# Patient Record
Sex: FEMALE | Race: WHITE | Employment: FULL TIME | URBAN - NONMETROPOLITAN AREA
[De-identification: names, ages, dates, MRNs, and addresses within clinical notes are randomized per-mention and may not be internally consistent; named-entity substitution may affect disease eponyms.]

---

## 2017-10-19 ENCOUNTER — HOSPITAL ENCOUNTER (OUTPATIENT)
Dept: PHYSICAL THERAPY | Age: 37
Setting detail: THERAPIES SERIES
Discharge: HOME | End: 2017-10-19
Payer: COMMERCIAL

## 2017-10-19 PROCEDURE — 97110 THERAPEUTIC EXERCISES: CPT

## 2017-10-19 PROCEDURE — 97161 PT EVAL LOW COMPLEX 20 MIN: CPT

## 2017-10-19 ASSESSMENT — PAIN DESCRIPTION - PAIN TYPE: TYPE: ACUTE PAIN

## 2017-10-19 ASSESSMENT — PAIN SCALES - GENERAL: PAINLEVEL_OUTOF10: 3

## 2017-10-19 ASSESSMENT — PAIN DESCRIPTION - ORIENTATION: ORIENTATION: LEFT

## 2017-10-19 ASSESSMENT — PAIN DESCRIPTION - LOCATION: LOCATION: KNEE

## 2017-10-19 NOTE — PROGRESS NOTES
Left leg 4+-5/5 throughout with pain during knee flexion and adduction. Special Tests: Left: negative ant/post drawer, negative med/lat col ligament testing, pain with medial Julianne but not lateral.          Overall Sensation Status: WNL              Ambulation 1  Surface: carpet  Device: No Device  Assistance: Independent  Quality of Gait: Good sundeep and appears to have even step length. No limp noted. Distance: 50 feet        Exercises  Exercise 1: Kinesiotape I strip starting just sup to patella and laying down with 25% tension over medial half of patella to just distal to patella  Exercise 2: Educated patient on and had demonstrate HEP: 10x quad sets holding 5 seconds, adduction squeezes holding 5 seconds, SLR, SLR with ER, SAQ with ball between knees and standing HS stretch 3x holding 15 seconds each              Activity Tolerance:  Activity Tolerance: Patient Tolerated treatment well    Assessment: Body structures, Functions, Activity limitations: Decreased functional mobility , Decreased ADL status, Decreased ROM, Decreased strength, Decreased endurance  Assessment: Patient with several areas mentioned above that can be addressed by therapy over 8 weeks  Prognosis: Good  Discharge Recommendations: Continue to assess pending progress    Patient Education:  Patient Education: POC and HEP. If reaction to tape can take off. Can keep tape on for 5-7 days if helping and no reaction.                    Plan:  Times per week: 1-2x/week  Plan weeks: 8 weeks  Specific instructions for Next Treatment: kinesiotape, stretches for knee flexion/HS and IT band, manual therapy/Hawk , strengthening, return to running education  Current Treatment Recommendations: Strengthening, ROM, Functional Mobility Training, Gait Training, Stair training, Neuromuscular Re-education, Modalities, Home Exercise Program, Manual Therapy - Soft Tissue Mobilization  Plan Comment: Initiated POC    History: Personal factors or

## 2017-10-26 ENCOUNTER — HOSPITAL ENCOUNTER (OUTPATIENT)
Dept: PHYSICAL THERAPY | Age: 37
Setting detail: THERAPIES SERIES
Discharge: HOME | End: 2017-10-26
Payer: COMMERCIAL

## 2017-10-26 PROCEDURE — 97110 THERAPEUTIC EXERCISES: CPT

## 2017-10-26 ASSESSMENT — PAIN DESCRIPTION - PAIN TYPE: TYPE: ACUTE PAIN

## 2017-10-26 ASSESSMENT — PAIN DESCRIPTION - ORIENTATION: ORIENTATION: LEFT

## 2017-10-26 ASSESSMENT — PAIN SCALES - GENERAL: PAINLEVEL_OUTOF10: 1

## 2017-10-26 ASSESSMENT — PAIN DESCRIPTION - LOCATION: LOCATION: KNEE

## 2017-10-26 NOTE — PROGRESS NOTES
seconds x 3 each  Exercise 4: L TKE with red 15 x 5 seconds - no pain with performance         Activity Tolerance:  Activity Tolerance: Patient Tolerated treatment well    Assessment:  Assessment: Minimal progressions due to pt showing up late to appointment. Continued to apply tape with no redness or irritation noted when old tape was removed with rubbing alcohol. Added standing TKE with good tolerance. Pt reporting knee feeling a little sore when finished. More soreness reported with SLR with ER. Prognosis: Good       Patient Education:  Patient Education: Continue with HEP. Gave red theraband and handout for TKE. can take tape off if irritation occurs. Plan:  Times per week: 1-2x/week  Plan weeks: 8 weeks  Specific instructions for Next Treatment: kinesiotape, stretches for knee flexion/HS and IT band, manual therapy/Hawk , strengthening, return to running education  Current Treatment Recommendations: Strengthening, ROM, Functional Mobility Training, Gait Training, Stair training, Neuromuscular Re-education, Modalities, Home Exercise Program, Manual Therapy - Soft Tissue Mobilization    Goals:  Patient goals : To be feeling comfortable running again and be able to squat to  kids. Short term goals  Time Frame for Short term goals: 4 weeks  Short term goal 1: Patient to report 25-50% decrease in left knee pain and clicking to be able to start jogging on the treadmill again  Short term goal 2: Patient to demonstrate 5/5 strength in left leg with no pain during testing to be able to squat wihtout pain to  children  Short term goal 3: Patient to demonstrate left knee flexion to 144 degrees to be equal to right for ease with dressing  Short term goal 4: Patient to be able to jog on treadmill with good mechanics and no compensation. Short term goal 5: Patient to be independent with taping of left knee as needed to help keep patella in neutral position.     Long term

## 2017-10-27 ENCOUNTER — HOSPITAL ENCOUNTER (OUTPATIENT)
Dept: PHYSICAL THERAPY | Age: 37
Setting detail: THERAPIES SERIES
Discharge: HOME | End: 2017-10-27
Payer: COMMERCIAL

## 2017-10-27 PROCEDURE — 97110 THERAPEUTIC EXERCISES: CPT

## 2017-10-27 ASSESSMENT — PAIN DESCRIPTION - PAIN TYPE: TYPE: ACUTE PAIN

## 2017-10-27 ASSESSMENT — PAIN DESCRIPTION - ORIENTATION: ORIENTATION: LEFT

## 2017-10-27 ASSESSMENT — PAIN DESCRIPTION - LOCATION: LOCATION: KNEE

## 2017-10-27 ASSESSMENT — PAIN SCALES - GENERAL: PAINLEVEL_OUTOF10: 2

## 2017-10-27 NOTE — PROGRESS NOTES
[place]     Time In: 0900  Time Out: 4244  Minutes: 39  Timed Code Treatment Minutes: 39 Minutes     Date: 10/27/2017  Patient Name: Norma Brown,  Gender:  female        CSN: [id number]   : 1980  (40 y.o.)       Referring Practitioner: Dr Siri Henderson      Diagnosis: Acute pain of left knee  Treatment Diagnosis: left knee pain, left leg weakness   Additional Pertinent Hx: Limit squatting and using stairs. Hx: no signficant history                  General:  PT Visit Information  PT Insurance Information: [facility] - no visit limit. Aquatics and modalities covered  Total # of Visits to Date: 3  Plan of Care/Certification Expiration Date: 17  Progress Note Counter: 3/10 for PN               Subjective:  Chart Reviewed: Yes  Patient assessed for rehabilitation services?: Yes  Family / Caregiver Present: No  Comments: Follow up with doctor beginning of November. States has clicking in left knee and will feel like it will buckle at times. Subjective: Patient reports knee is doing ok today, just some soreness. States not as much clicking when going up and down the stairs but still pain with a full squat. States tape still intact and seems to be helping.      Pain:  Patient Currently in Pain: Yes  Pain Assessment: 0-10  Pain Level: 2 (1-2/10)  Pain Type: Acute pain  Pain Location: Knee  Pain Orientation: Left      Objective    Exercises  Exercise 1: Still intact from yesterday so none needed today - Kinesiotape I strip starting just sup to patella and laying down with 25% tension over medial half of patella to just distal to patella  Exercise 3: Standing HS stretch 15 seconds x 3 each  Exercise 4: L TKE with red 15 x 5 seconds - no pain with performance  Exercise 5: Sport bike seat 2 for 5 minutes level 3  Exercise 6: Rockerboard 10x each of 2 directions with 30 second balance each way  Exercise 7: Standing: 10x each way heel/toe raises, marching 10x bilat, 4-way hip 10x bilat  Exercise 8: Lunges onto BOSU 10x bilat  Exercise 9: Total gym Level E 10x limited range (10-15 deg) squats with ball between knees - no pain. Exercise 10: Hydrostick step stance each way with 10x each of 4 firections         Activity Tolerance:  Activity Tolerance: Patient Tolerated treatment well  Activity Tolerance: Patient reports no change in knee soreness after treatment, just reported leg feeling a little \"jello-y\". Assessment: Body structures, Functions, Activity limitations: Decreased functional mobility , Decreased ADL status, Decreased ROM, Decreased strength, Decreased endurance  Assessment: Patient able to start new loaded strengthening today without increase in pain. Needed cueing for posture with exercises. Prognosis: Good  Discharge Recommendations: Continue to assess pending progress    Patient Education:  Patient Education: Continue with HEP. Start new exercises at home. Plan:  Times per week: 1-2x/week  Plan weeks: 8 weeks  Specific instructions for Next Treatment: kinesiotape, stretches for knee flexion/HS and IT band, manual therapy/Hawk , strengthening, return to running education  Current Treatment Recommendations: Strengthening, ROM, Functional Mobility Training, Gait Training, Stair training, Neuromuscular Re-education, Modalities, Home Exercise Program, Manual Therapy - Soft Tissue Mobilization  Plan Comment: Continue with POC. Goals:  Patient goals : To be feeling comfortable running again and be able to squat to  kids.     Short term goals  Time Frame for Short term goals: 4 weeks  Short term goal 1: Patient to report 25-50% decrease in left knee pain and clicking to be able to start jogging on the treadmill again  Short term goal 2: Patient to demonstrate 5/5 strength in left leg with no pain during testing to be able to squat wihtout pain to  children  Short term goal 3: Patient to demonstrate left knee flexion to 144 degrees to be equal to right for ease with dressing  Short term goal 4: Patient to be able to jog on treadmill with good mechanics and no compensation. Short term goal 5: Patient to be independent with taping of left knee as needed to help keep patella in neutral position. Long term goals  Time Frame for Long term goals : 8 weeks  Long term goal 1: Patient to be independent with home program to be able to return to running.          [address], [address] [place] [address]

## 2017-10-31 ENCOUNTER — HOSPITAL ENCOUNTER (OUTPATIENT)
Dept: PHYSICAL THERAPY | Age: 37
Setting detail: THERAPIES SERIES
Discharge: HOME | End: 2017-10-31
Payer: COMMERCIAL

## 2017-10-31 PROCEDURE — 97110 THERAPEUTIC EXERCISES: CPT

## 2017-10-31 ASSESSMENT — PAIN SCALES - GENERAL: PAINLEVEL_OUTOF10: 3

## 2017-10-31 NOTE — PROGRESS NOTES
(10-15 deg) squats with ball between knees - no pain. Exercise 10: Hydrostick step stance each way with 10x each of 4 firections  Exercise 11: Forward and lateral step ups 4\" x10 bilat         Activity Tolerance:  Activity Tolerance: Patient Tolerated treatment well  Activity Tolerance: Patient reports no change in knee soreness after treatment. Assessment: Body structures, Functions, Activity limitations: Decreased functional mobility , Decreased ADL status, Decreased ROM, Decreased strength, Decreased endurance  Assessment: Applied kinesiotape today with no irritation or reddness when old tape was removed with alcohol/water. Increased reps with total gym, rockerboard, heel/toe raises and marching with good tolerance from patient. Added step ups with no complaints from patient. Pt denied any increased pain at end of treatment session. Prognosis: Good  Discharge Recommendations: Continue to assess pending progress    Patient Education:  Patient Education: Continue with HEP. Start new exercises at home. Plan:  Times per week: 1-2x/week  Plan weeks: 8 weeks  Specific instructions for Next Treatment: kinesiotape, stretches for knee flexion/HS and IT band, manual therapy/Hawk , strengthening, return to running education  Current Treatment Recommendations: Strengthening, ROM, Functional Mobility Training, Gait Training, Stair training, Neuromuscular Re-education, Modalities, Home Exercise Program, Manual Therapy - Soft Tissue Mobilization  Plan Comment: Continue with POC. Goals:  Patient goals : To be feeling comfortable running again and be able to squat to  kids.     Short term goals  Time Frame for Short term goals: 4 weeks  Short term goal 1: Patient to report 25-50% decrease in left knee pain and clicking to be able to start jogging on the treadmill again  Short term goal 2: Patient to demonstrate 5/5 strength in left leg with no pain during testing to be able to squat wihtout pain to

## 2017-11-02 ENCOUNTER — HOSPITAL ENCOUNTER (OUTPATIENT)
Dept: PHYSICAL THERAPY | Age: 37
Setting detail: THERAPIES SERIES
Discharge: HOME | End: 2017-11-02
Payer: COMMERCIAL

## 2017-11-02 PROCEDURE — 97110 THERAPEUTIC EXERCISES: CPT

## 2017-11-02 ASSESSMENT — PAIN DESCRIPTION - PAIN TYPE: TYPE: ACUTE PAIN

## 2017-11-02 ASSESSMENT — PAIN SCALES - GENERAL: PAINLEVEL_OUTOF10: 2

## 2017-11-02 ASSESSMENT — PAIN DESCRIPTION - ORIENTATION: ORIENTATION: LEFT

## 2017-11-02 ASSESSMENT — PAIN DESCRIPTION - LOCATION: LOCATION: KNEE

## 2017-11-02 NOTE — PROGRESS NOTES
[place]     Time In: 6797  Time Out: [address]  Minutes: 32  Timed Code Treatment Minutes: 32 Minutes     Date: 2017  Patient Name: Yue Cross,  Gender:  female        CSN: [id number]   : 1980  (40 y.o.)       Referring Practitioner: Dr Rodney Allison      Diagnosis: Acute pain of left knee  Treatment Diagnosis: left knee pain, left leg weakness   Additional Pertinent Hx: Limit squatting and using stairs. Hx: no signficant history                  General:  PT Visit Information  Onset Date: 10/19/17  PT Insurance Information: [address] - no visit limit. Aquatics and modalities covered  Total # of Visits to Date: 5  Plan of Care/Certification Expiration Date: 17  Progress Note Counter: 5/10 for PN               Subjective:  Family / Caregiver Present: No  Comments: Follow up with doctor beginning of November. States has clicking in left knee and will feel like it will buckle at times. Other (Comment): Script for chondromalacia patella 6-8 visits. Pelvic/core stability, Short arc closed chair quad strengthening, HS stretch, SLR ok     Subjective: Pt reporting knee pain 2/10 today and still thinking that the KT tape is helping.       Pain:  Patient Currently in Pain: Yes  Pain Assessment: 0-10  Pain Level: 2  Pain Type: Acute pain  Pain Location: Knee  Pain Orientation: Left      Objective  Exercises  Exercise 3: Standing HS stretch 15 seconds x 3 each - cues to keep a flat back  Exercise 4: L TKE with red 20 x 5 seconds - B 4 way hip with red x 10 each - no adduction on right leg due to causing pain on L knee  Exercise 5: Sport bike seat 2 for 5 minutes level 3  Exercise 6: Rocker board 15x each of 2 directions with 30 second balance each way  Exercise 7: Standing on foam: 15x each way heel/toe raises, marching 15x bilat  Exercise 8: Lunges onto BOSU 10x bilat  Exercise 9: Total gym Level E 15x limited range (10-15 deg) squats with ball between knees - no pain. Exercise 10: Hydro stick step stance each way with 10x each of 4 directions  Exercise 11: Forward and lateral step ups 4\" x15 bilat  Exercise 12: NuStep seat 9/arms 9 Level 4 x 5 minutes  Exercise 13: Eccentric Lowering off 2\" x 15 - no pain reported and no UE's used         Activity Tolerance:  Activity Tolerance: Patient Tolerated treatment well    Assessment:  Assessment: Pt reporting that kinesiotape was staying on well and did not need to reapply today. Progressed with t-band for 4 way hip but had to hold adduction on right LE due to causing pain on L and added excentric lowing with pt tolerating well. Pt needing minimal cuing during session for correct technique but with no complains of increased knee pain at end of session. Prognosis: Good       Patient Education:  Patient Education: Continue with HEP and gave hand out for 3 way hip with t-band. Plan:  Times per week: 1-2x/week  Plan weeks: 8 weeks  Specific instructions for Next Treatment: kinesiotape, stretches for knee flexion/HS and IT band, manual therapy/Hawk , strengthening, return to running education  Current Treatment Recommendations: Strengthening, ROM, Functional Mobility Training, Gait Training, Stair training, Neuromuscular Re-education, Modalities, Home Exercise Program, Manual Therapy - Soft Tissue Mobilization  Plan Comment: Continue with POC. Goals:  Patient goals : To be feeling comfortable running again and be able to squat to  kids.     Short term goals  Time Frame for Short term goals: 4 weeks  Short term goal 1: Patient to report 25-50% decrease in left knee pain and clicking to be able to start jogging on the treadmill again  Short term goal 2: Patient to demonstrate 5/5 strength in left leg with no pain during testing to be able to squat wihtout pain to  children  Short term goal 3: Patient to demonstrate left knee flexion

## 2017-11-06 ENCOUNTER — HOSPITAL ENCOUNTER (OUTPATIENT)
Dept: PHYSICAL THERAPY | Age: 37
Setting detail: THERAPIES SERIES
Discharge: HOME | End: 2017-11-06
Payer: COMMERCIAL

## 2017-11-06 PROCEDURE — 97110 THERAPEUTIC EXERCISES: CPT

## 2017-11-06 ASSESSMENT — PAIN DESCRIPTION - LOCATION: LOCATION: KNEE

## 2017-11-06 ASSESSMENT — PAIN SCALES - GENERAL: PAINLEVEL_OUTOF10: 1

## 2017-11-06 ASSESSMENT — PAIN DESCRIPTION - ORIENTATION: ORIENTATION: LEFT

## 2017-11-06 ASSESSMENT — PAIN DESCRIPTION - PAIN TYPE: TYPE: ACUTE PAIN

## 2017-11-06 NOTE — PROGRESS NOTES
3  Exercise 6: Rockerboard 15x each of 2 directions with 30 second balance each way  Exercise 7: Standing on foam: 15x each way heel/toe raises, marching 15x bilat  Exercise 8: Lunges onto BOSU 10x bilat  Exercise 9: Total gym Level E 15x limited range (10-15 deg) squats with ball between knees - no pain. Exercise 10: Hydrostick step stance each way with 10x each of 4 directions  Exercise 11: Forward and lateral step ups 4\" x15 bilat - patient started having clicking and pain in left knee with sidestepping so decreased to 2\" and was better  Exercise 13: Eccentric Lowering off 2\" x 10 - no pain reported and no UE's used. Was doing 15x but started to ckick at 10x so stopped. Exercise 14: Discussed HEP and educated on what to continue with. Gave pictures for standing exercises. Activity Tolerance:  Activity Tolerance: Patient Tolerated treatment well  Activity Tolerance: Patient reported painincreased from 1/10 to 2/10 after session. Assessment: Body structures, Functions, Activity limitations: Decreased functional mobility , Decreased ADL status, Decreased ROM, Decreased strength, Decreased endurance  Assessment: Patient has made minimal progress with pain and clicking in knee but has gained increased strength without pain during testing. Patient is able to be independent with current HEP but could be progressed to more use of [place]. Therapy is helping patient overall and will wait to see if doctor feels more therapy would be beneficial for another 3-5 weeks. Prognosis: Good  Discharge Recommendations: Continue to assess pending progress    Patient Education:  Patient Education: Continue with HEP and let know what doctor says after appointment.                       Plan:  Times per week: 1-2x/week  Plan weeks: 5 weeks  Specific instructions for Next Treatment: kinesiotape, stretches for knee flexion/HS and IT band, manual therapy/Hawk , strengthening, return to running education  Current Treatment Recommendations: Strengthening, ROM, Functional Mobility Training, Gait Training, Stair training, Neuromuscular Re-education, Modalities, Home Exercise Program, Manual Therapy - Soft Tissue Mobilization  Plan Comment: Continue with POC. Patient sees doctor and has one more therapy visit scheduled then will adjust POC as needed. Goals:  Patient goals : To be feeling comfortable running again and be able to squat to  kids. Short term goals  Time Frame for Short term goals: 4 weeks  Short term goal 1: Patient to report 25-50% decrease in left knee pain and clicking to be able to start jogging on the treadmill again - NOT MET Patient reports pain and clicking is 24% better. States has not tried jogging on the treadmill yet. Short term goal 2: Patient to demonstrate 5/5 strength in left leg with no pain during testing to be able to squat wihtout pain to  children - NOT MET Patient has met goal for 5/5 throughout most of leg with knee extension 4+/5 and no pain during testing. Short term goal 3: Patient to demonstrate left knee flexion to 144 degrees to be equal to right for ease with dressing - NOT MET Patient has gained 1 degree and is at 141 degrees. Short term goal 4: Patient to be able to jog on treadmill with good mechanics and no compensation. - NOT MET Patient unable to return to at this time. Short term goal 5: Patient to be independent with taping of left knee as needed to help keep patella in neutral position. - MET     Long term goals  Time Frame for Long term goals : 8 weeks  Long term goal 1: Patient to be independent with home program to be able to return to running. - NOT MET Patient is independent with current HEP but needs further progression and has not returned to running yet.     Revised Short-Term Goals:    Short term goals  Time Frame for Short term goals: 5 weeks  Short term goal 1: See LTG  Short term goal 2:    Short term goal 3:    Short term goal 4:    Short term goal 5:       Revised Long-Term Goals  Long term goals  Time Frame for Long term goals : 5 weeks  Long term goal 1: Patient to be independent with home program to be able to return to running. Long term goal 2: Patient to report 25-50% decrease in left knee pain and clicking to be able to start jogging on the treadmill again  Long term goal 3: Patient to demonstrate 5/5 strength in left knee extension and 144 degrees flexion with no pain during testing to be able to squat wihtout pain to  children  Long term goal 4: Patient to be able to jog on treadmill with good mechanics and no compensation.            [address], [address] [place] [address] Drive

## 2017-11-09 ENCOUNTER — HOSPITAL ENCOUNTER (OUTPATIENT)
Dept: PHYSICAL THERAPY | Age: 37
Setting detail: THERAPIES SERIES
End: 2017-11-09
Payer: COMMERCIAL

## 2017-11-15 NOTE — PROGRESS NOTES
[facility]    Patient Name: Sowmya Humphreys        CSN: [id number]   YOB: 1980  Gender: female  Referring Practitioner: Dr Vince Gaffney  Diagnosis: Acute pain of left knee    Patient is discharged from Physical Therapy services at this time. See last note for details related to results of therapy and goal achievement. Reason for discharge:  Patient was seen for re-assess on 11-6-17. Patient saw doctor on 11-8-17 and was supposed to call PT to let know regarding further therapy and if needed to keep re-assess on 11-9-17. Patient called and cancelled appointment on 11-9 but stated did not know from the doctor about therapy and would call us back. Patient has not called back and PT called patient on 11-13-17 and left a message to call back regarding situation. Since patient has not called back she is being discharged.  See last progress note for goal update  Patient discharged as of 11-15-17      [place] [id number]: 11/15/2017

## 2018-11-07 ENCOUNTER — NURSE TRIAGE (OUTPATIENT)
Dept: ADMINISTRATIVE | Age: 38
End: 2018-11-07

## 2018-11-07 NOTE — TELEPHONE ENCOUNTER
\"I had the stomach flu on Sunday and then on Monday I had a bad HA and now I still have a HA  And my neck feels stiff. \"    Reason for Disposition   [1] MODERATE headache (e.g., interferes with normal activities) AND [2] present > 24 hours AND [3] unexplained  (Exceptions: analgesics not tried, typical migraine, or headache part of viral illness)    Answer Assessment - Initial Assessment Questions  1. LOCATION: \"Where does it hurt? \"       Top and in behind the eyes and temples  2. ONSET: \"When did the headache start? \" (Minutes, hours or days)       Monday morning and worse when lying down, fine when she is up but when she goes to bed and lies down the pain starts  3. PATTERN: \"Does the pain come and go, or has it been constant since it started? \"      Constant pain mild but worse when lying down  4. SEVERITY: \"How bad is the pain? \" and \"What does it keep you from doing? \"  (e.g., Scale 1-10; mild, moderate, or severe)    - MILD (1-3): doesn't interfere with normal activities     - MODERATE (4-7): interferes with normal activities or awakens from sleep     - SEVERE (8-10): excruciating pain, unable to do any normal activities         6   And tried tylenol and motrin  5. RECURRENT SYMPTOM: \"Have you ever had headaches before? \" If so, ask: \"When was the last time? \" and \"What happened that time? \"       Yes had ocular HA before, pain worse when bending down also  6. CAUSE: \"What do you think is causing the headache? \"      Not sure, stiff neck when turning neck to the R and feels stiff but chin to chest fine but hurts in the neck, dizzy spells since HA started and has dizziness at least 6 times in a day  7. MIGRAINE: \"Have you been diagnosed with migraine headaches? \" If so, ask: \"Is this headache similar? \"       no  8. HEAD INJURY: \"Has there been any recent injury to the head? \"       no  9. OTHER SYMPTOMS: \"Do you have any other symptoms? \" (fever, stiff neck, eye pain, sore throat, cold symptoms)      Fever on Sunday and

## 2019-01-02 ENCOUNTER — TELEPHONE (OUTPATIENT)
Dept: ENT CLINIC | Age: 39
End: 2019-01-02

## 2019-02-06 ENCOUNTER — OFFICE VISIT (OUTPATIENT)
Dept: ENT CLINIC | Age: 39
End: 2019-02-06
Payer: COMMERCIAL

## 2019-02-06 VITALS
RESPIRATION RATE: 16 BRPM | HEIGHT: 68 IN | SYSTOLIC BLOOD PRESSURE: 126 MMHG | WEIGHT: 241.2 LBS | TEMPERATURE: 98.2 F | DIASTOLIC BLOOD PRESSURE: 86 MMHG | BODY MASS INDEX: 36.56 KG/M2 | HEART RATE: 88 BPM

## 2019-02-06 DIAGNOSIS — R51.9 NONINTRACTABLE EPISODIC HEADACHE, UNSPECIFIED HEADACHE TYPE: ICD-10-CM

## 2019-02-06 DIAGNOSIS — J35.8 TONSIL STONE: Primary | ICD-10-CM

## 2019-02-06 PROCEDURE — G8417 CALC BMI ABV UP PARAM F/U: HCPCS | Performed by: OTOLARYNGOLOGY

## 2019-02-06 PROCEDURE — 99203 OFFICE O/P NEW LOW 30 MIN: CPT | Performed by: OTOLARYNGOLOGY

## 2019-02-06 PROCEDURE — G8484 FLU IMMUNIZE NO ADMIN: HCPCS | Performed by: OTOLARYNGOLOGY

## 2019-02-06 PROCEDURE — G8427 DOCREV CUR MEDS BY ELIG CLIN: HCPCS | Performed by: OTOLARYNGOLOGY

## 2019-02-06 ASSESSMENT — ENCOUNTER SYMPTOMS
CHOKING: 0
COUGH: 0
STRIDOR: 0
WHEEZING: 0
EYE REDNESS: 0
CHEST TIGHTNESS: 0
EYE DISCHARGE: 0
SHORTNESS OF BREATH: 0
FACIAL SWELLING: 0
BACK PAIN: 0
COLOR CHANGE: 0
DIARRHEA: 0
VOICE CHANGE: 0
EYE ITCHING: 1
RHINORRHEA: 0
NAUSEA: 0
ABDOMINAL PAIN: 0
VOMITING: 0
PHOTOPHOBIA: 0
TROUBLE SWALLOWING: 0
SINUS PRESSURE: 1
SORE THROAT: 0
EYE PAIN: 0
APNEA: 0

## 2019-02-15 ENCOUNTER — HOSPITAL ENCOUNTER (OUTPATIENT)
Dept: MRI IMAGING | Age: 39
Discharge: HOME | End: 2019-02-15
Payer: COMMERCIAL

## 2019-02-15 DIAGNOSIS — R51.9 NONINTRACTABLE EPISODIC HEADACHE, UNSPECIFIED HEADACHE TYPE: ICD-10-CM

## 2019-02-15 PROCEDURE — 70551 MRI BRAIN STEM W/O DYE: CPT

## 2019-03-25 ENCOUNTER — TELEPHONE (OUTPATIENT)
Dept: ENT CLINIC | Age: 39
End: 2019-03-25

## 2019-03-25 DIAGNOSIS — R51.9 NONINTRACTABLE EPISODIC HEADACHE, UNSPECIFIED HEADACHE TYPE: Primary | ICD-10-CM

## 2019-04-24 ENCOUNTER — INITIAL CONSULT (OUTPATIENT)
Dept: NEUROLOGY | Age: 39
End: 2019-04-24
Payer: COMMERCIAL

## 2019-04-24 VITALS
WEIGHT: 244 LBS | HEIGHT: 68 IN | DIASTOLIC BLOOD PRESSURE: 72 MMHG | SYSTOLIC BLOOD PRESSURE: 128 MMHG | BODY MASS INDEX: 36.98 KG/M2 | HEART RATE: 80 BPM

## 2019-04-24 DIAGNOSIS — G43.119 INTRACTABLE MIGRAINE WITH AURA WITHOUT STATUS MIGRAINOSUS: Primary | ICD-10-CM

## 2019-04-24 PROCEDURE — 99244 OFF/OP CNSLTJ NEW/EST MOD 40: CPT | Performed by: PSYCHIATRY & NEUROLOGY

## 2019-04-24 PROCEDURE — G8427 DOCREV CUR MEDS BY ELIG CLIN: HCPCS | Performed by: PSYCHIATRY & NEUROLOGY

## 2019-04-24 PROCEDURE — G8417 CALC BMI ABV UP PARAM F/U: HCPCS | Performed by: PSYCHIATRY & NEUROLOGY

## 2019-04-24 RX ORDER — CETIRIZINE HYDROCHLORIDE 10 MG/1
10 TABLET ORAL DAILY
Status: ON HOLD | COMMUNITY
End: 2021-04-13

## 2019-04-24 RX ORDER — SUMATRIPTAN 50 MG/1
50 TABLET, FILM COATED ORAL
Qty: 9 TABLET | Refills: 3 | Status: SHIPPED | OUTPATIENT
Start: 2019-04-24 | End: 2019-11-27 | Stop reason: SDUPTHER

## 2019-04-24 NOTE — PATIENT INSTRUCTIONS
1. Start Imitrex 50 mg as needed for headache  2. EEG  3. Limit your use of over the counter NSAIDs  4. Vitamin B12, folate  5. Call with any new symptoms or concerns. 6. Follow up in 1 month.

## 2019-04-24 NOTE — LETTER
[facility]  [address] [facility] [address]  Dept: [phone]  Dept Fax: [phone]  Loc: Connie Mcbride MD        4/24/2019      Patient:  Pat Perdomo  MRN:  [id number]  YOB: 1980  Date of Visit:  4/24/2019    Dear Dr. Pino Tobias,    Thank you for referring Halle Castro to me for consultation. Please see attached visit summary with my findings. If you have any questions, please do not hesitate to call me.       Sincerely,         Meli Yan MD

## 2019-05-03 ENCOUNTER — HOSPITAL ENCOUNTER (OUTPATIENT)
Dept: NEUROLOGY | Age: 39
Discharge: HOME | End: 2019-05-03
Payer: COMMERCIAL

## 2019-05-03 PROCEDURE — 95819 EEG AWAKE AND ASLEEP: CPT

## 2019-05-03 NOTE — PROGRESS NOTES
[facility] Laboratory Technician worksheet       EEG Date: 5/3/2019    Name: Oksana Ferrer   : 1980   Age: 41 y.o. SEX: female    Room: OP    MRN: [id number]     CSN: [id number]    Ordering Provider: Andrea Toribio CNP  EEG Number: [id number] Time of Test:  09:21    Hand: Right   Sedation: No    H.V. Done: Yes with good effort Photic: Yes    Sleep: No   Drowsy: Yes   Sleep Deprived: Yes    Seizures observed: no    Technician impression:1    Mentality: alert       Clinical History: Pt is having recurrent headaches. Recent MRI showed no masses or lesions, however changes were noted that may be a cause of headaches. Pt stated she has a headache at the time of her EEG today. Past Medical History:       Diagnosis Date    Fibroid uterus 2018    Infertility, female          Prior to Admission medications    Medication Sig Start Date End Date Taking?  Authorizing Provider   cetirizine (ZYRTEC) 10 MG tablet Take 10 mg by mouth daily    Historical Provider, MD   SUMAtriptan (IMITREX) 50 MG tablet Take 1 tablet by mouth once as needed for Migraine 19  uKsum Peace APRN - CNP   Multiple Vitamins-Minerals (THERAPEUTIC MULTIVITAMIN-MINERALS) tablet Take 1 tablet by mouth daily    Historical Provider, MD       Technician: Zoë Pena 5/3/2019

## 2019-05-06 NOTE — PROGRESS NOTES
cetirizine (ZYRTEC) 10 MG tablet Take 10 mg by mouth daily ? ?    Multiple Vitamins-Minerals (THERAPEUTIC MULTIVITAMIN-MINERALS) tablet Take 1 tablet by mouth daily ? ? No current facility-administered medications for this visit. Social History     Socioeconomic History    Marital status:    ? ? Spouse name: None    Number of children: None    Years of education: None    Highest education level: None   Occupational History    None   Social Needs    Financial resource strain: None    Food insecurity:   ? ? Worry: None   ? ? Inability: None    Transportation needs:   ? ? Medical: None   ? ? Non-medical: None   Tobacco Use    Smoking status: Never Smoker    Smokeless tobacco: Never Used   Substance and Sexual Activity    Alcohol use: Yes   ? ? Comment: socially    Drug use: No    Sexual activity: Yes   ? ? Partners: Male   Lifestyle    Physical activity:   ? ? Days per week: None   ? ? Minutes per session: None    Stress: None   Relationships    Social connections:   ? ? Talks on phone: None   ? ? Gets together: None   ? ? Attends [employer] service: None   ? ? Active member of club or organization: None   ? ? Attends meetings of clubs or organizations: None   ? ? Relationship status: None    Intimate partner violence:   ? ? Fear of current or ex partner: None   ? ? Emotionally abused: None   ? ? Physically abused: None   ? ? Forced sexual activity: None   Other Topics Concern    None   Social History Narrative    None     Family History   Problem Relation Age of Onset    High Cholesterol Father ?  High Blood Pressure Father ?  Other Mother ?  Learning Disabilities Sister ?  Other Sister ?    Review of Systems   Complete review of systems is negative other than what is mentioned in HPI  Vitals:   ? 04/24/19 0834   BP: 128/72   Site: Left Upper Arm   Position: Sitting   Cuff Size: Large Adult   Pulse: 80   Weight: 244 lb (110.7 kg)   Height: 5' 8\" (1.727 m)   Physical ? Narrative PROCEDURE: MRI BRAIN WO CONTRAST  CLINICAL INFORMATION Nonintractable episodic headache, unspecified headache type. Frontal headaches. Pain behind her eyes for the past 2 years. The headaches have become more frequent. COMPARISON: No prior study. TECHNIQUE: Multiplanar and multiple spin echo MRI images were obtained of the brain without contrast.  FINDINGS:  The diffusion-weighted images are normal. The brain volume is normal. There is a minimal amount of abnormal signal in the deep white matter the brain. These foci are small and faint. There are no intra-or extra-axial collections. There is no hydrocephalus, midline shift or mass effect. There is mineralization in the medial aspects of the basal ganglia bilaterally. No other areas of susceptibility artifact are present. The major intracranial vascular flow voids are present. The midline craniocervical junction structures are normal. The pituitary gland and brainstem are normal.   ? Impression Minimal amount of abnormal signal in deep white matter of the brain. This could be due to chronic headaches. This can also be related to chronic small vessel ischemic changes, prior inflammation or prior trauma. **This report has been created using voice recognition software. It may contain minor errors which are inherent in voice recognition technology. **  Final report electronically signed by Dr. Ronald Lamas on 2/15/2019 4:58 PM   ?  We reviewed the patient records from referring provider and available information in the EHR   ASSESSMENT:   ? Diagnosis Orders   1. Intractable migraine with aura without status migrainosus  ? This is a 41year old female who presents with headache for the past 1 year. She had MRI brain done 2/2019 showing minimal amount of abnormal signal in deep white matter of the brain. This could be due to chronic headaches.  This can also be related to chronic small vessel ischemic changes, prior inflammation or prior trauma, I reviewed the MRI brain. She provides history of encephalitis as a child, hence findings on her MRI brain may be explained by history provided of encephalitis as a child. Her exam is non focal. She is not interested in preventative medication at this time for her headache, but is interested in rescue medication as she feels her headaches are not frequent enough to warrant taking a daily medication. We will also arrange for her to undergo EEG and check vitamin levels. After detailed discussion with patient we agreed on the following plan. Plan   1. Start Imitrex 50 mg as needed for headache  2. EEG  3. Vitamin B12, folate  4. Call with any new symptoms or concerns. 5. Follow up in 1 month.      Alisha Thornton MD

## 2019-05-06 NOTE — PROCEDURES
[address] [place] [address]                          ELECTROENCEPHALOGRAM REPORT    PATIENT NAME: Jean-Pierre Smart                  :        1980  MED REC NO:   [id number]                           ROOM:  ACCOUNT NO:   [de-identified]                           ADMIT DATE: 2019  PROVIDER:     Alexandra Asif. Michelle Sotelo MD    DATE OF EE2019    REFERRING PROVIDER:  Diana Villalba CNP    CLINICAL HISTORY:  This is a 27-year-old female with presentation of  recurrent headache, intermittent. CLINICAL INTERPRETATION:  This is a 17-channel EEG performed without  sleep deprivation. Hyperventilation and photic stimulation were  performed. The patient is described as alert. The background rhythm activity is noted to be 9-10 Hz in the posterior  parietal area, symmetric, well modulated, attenuates with eye opening. Hyperventilation and photic stimulation were performed without  abnormality. Light stages of sleep are seen during the recording. There was no evidence of epileptiform activity appreciated. IMPRESSION:  This is a normal EEG. There was no evidence of  epileptiform activity appreciated.         Nancy Gale MD    D: 2019 10:15:38       T: 2019 10:19:46     [email or web address]  Job#: [id number]     Doc#: [id number]    CC:

## 2019-06-13 ENCOUNTER — NURSE ONLY (OUTPATIENT)
Dept: LAB | Age: 39
End: 2019-06-13

## 2019-06-13 DIAGNOSIS — G43.119 INTRACTABLE MIGRAINE WITH AURA WITHOUT STATUS MIGRAINOSUS: ICD-10-CM

## 2019-06-13 LAB
FOLATE: > 20 NG/ML (ref 4.8–24.2)
VITAMIN B-12: 412 PG/ML (ref 211–911)

## 2019-06-14 ENCOUNTER — TELEPHONE (OUTPATIENT)
Dept: NEUROLOGY | Age: 39
End: 2019-06-14

## 2019-06-14 ENCOUNTER — OFFICE VISIT (OUTPATIENT)
Dept: NEUROLOGY | Age: 39
End: 2019-06-14
Payer: COMMERCIAL

## 2019-06-14 VITALS
HEART RATE: 74 BPM | DIASTOLIC BLOOD PRESSURE: 76 MMHG | HEIGHT: 68 IN | BODY MASS INDEX: 36.98 KG/M2 | SYSTOLIC BLOOD PRESSURE: 118 MMHG | WEIGHT: 244 LBS

## 2019-06-14 DIAGNOSIS — G43.119 INTRACTABLE MIGRAINE WITH AURA WITHOUT STATUS MIGRAINOSUS: Primary | ICD-10-CM

## 2019-06-14 PROCEDURE — 99213 OFFICE O/P EST LOW 20 MIN: CPT | Performed by: NURSE PRACTITIONER

## 2019-06-14 PROCEDURE — G8417 CALC BMI ABV UP PARAM F/U: HCPCS | Performed by: NURSE PRACTITIONER

## 2019-06-14 PROCEDURE — G8427 DOCREV CUR MEDS BY ELIG CLIN: HCPCS | Performed by: NURSE PRACTITIONER

## 2019-06-14 PROCEDURE — 1036F TOBACCO NON-USER: CPT | Performed by: NURSE PRACTITIONER

## 2019-06-14 NOTE — PROGRESS NOTES
NEUROLOGY OUT PATIENT FOLLOW UP NOTE:  6/14/20199:34 AM    Isaac Palomo is here for follow up for headache. She reports her headaches have improved since last evaluation. She is using Imitrex for severe headaches, which helps. He is here to go over testing performed and the plan of care going forward. ROS:  Respiratory : no cough, no shortness of breath  Cardiac: no chest pain. No palpitations. Renal : no flank pain, no hematuria    Skin: no rash      No Known Allergies    Current Outpatient Medications:     cetirizine (ZYRTEC) 10 MG tablet, Take 10 mg by mouth daily, Disp: , Rfl:     Multiple Vitamins-Minerals (THERAPEUTIC MULTIVITAMIN-MINERALS) tablet, Take 1 tablet by mouth daily, Disp: , Rfl:     SUMAtriptan (IMITREX) 50 MG tablet, Take 1 tablet by mouth once as needed for Migraine, Disp: 9 tablet, Rfl: 3      PE:   Vitals:    06/14/19 0903   BP: 118/76   Site: Left Upper Arm   Position: Sitting   Cuff Size: Medium Adult   Pulse: 74   Weight: 244 lb (110.7 kg)   Height: 5' 8\" (1.727 m)     General Appearance:  awake, alert, oriented, in no acute distress  Gen: NAD, Language is Intact. Head: no rash, no icterus  Neck: There is no carotid bruits. The Neck is supple. Neuro: CN 2-12 grossly intact with no focal deficits. Power 5/5 Throughout symmetric, Reflexes are +2 symmetric. Long tracts are intact. Cerebellar exam is Intact. Sensory exam is intact to light touch. Gait is intact. Musculoskeletal:  Has no hand arthritis, no limitation of ROM in any of the four extremities.   Lower extremities no edema        DATA:  Results for orders placed or performed in visit on 06/13/19   Vitamin B12 & Folate   Result Value Ref Range    Vitamin B-12 412 211 - 911 pg/mL    Folate > 20.0 4.8 - 24.2 ng/mL        Results for orders placed during the hospital encounter of 02/15/19   MRI BRAIN WO CONTRAST    Narrative PROCEDURE: MRI BRAIN WO CONTRAST    CLINICAL INFORMATION Nonintractable episodic headache, supplements daily. After detailed discussion with patient we agreed on the following plan. Plan:  1. Continue with Imitrex 50 mg as needed for headache. 2. Start Vitamin B 12 sublingual supplements daily,  over the counter, at least 3000 mcg  3. Follow up in 6 months or sooner if needed  4. Call if any questions or concerns. Please call if any questions.      Gideon Lawrence CNP

## 2019-06-14 NOTE — PATIENT INSTRUCTIONS
1. Continue with Imitrex 50 mg as needed for headache. 2. Start Vitamin B 12 sublingual supplements daily,  over the counter, at least 3000 mcg  3. Follow up in 6 months or sooner if needed  4. Call if any questions or concerns.

## 2019-06-14 NOTE — TELEPHONE ENCOUNTER
----- Message from ERIKA Hanna CNP sent at 6/14/2019  7:55 AM EDT -----  Please have patient start vitamin B12 sublingual supplements, over the counter, at least 3000 mcg daily.    Tala Mcelroy, CNP

## 2019-06-24 ENCOUNTER — HOSPITAL ENCOUNTER (EMERGENCY)
Age: 39
Discharge: HOME | End: 2019-06-24
Attending: EMERGENCY MEDICINE
Payer: COMMERCIAL

## 2019-06-24 VITALS
SYSTOLIC BLOOD PRESSURE: 119 MMHG | WEIGHT: 235 LBS | RESPIRATION RATE: 16 BRPM | BODY MASS INDEX: 35.61 KG/M2 | OXYGEN SATURATION: 97 % | HEART RATE: 86 BPM | DIASTOLIC BLOOD PRESSURE: 69 MMHG | HEIGHT: 68 IN | TEMPERATURE: 97.2 F

## 2019-06-24 DIAGNOSIS — S30.861A INSECT BITE OF ABDOMEN, INITIAL ENCOUNTER: Primary | ICD-10-CM
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
## 2019-06-24 DIAGNOSIS — S20.469A INSECT BITE OF BACK, UNSPECIFIED LATERALITY, INITIAL ENCOUNTER: ICD-10-CM

## 2019-06-24 PROCEDURE — 99283 EMERGENCY DEPT VISIT LOW MDM: CPT

## 2019-06-24 RX ORDER — TRIAMCINOLONE ACETONIDE 1 MG/G
CREAM TOPICAL
Qty: 1 TUBE | Refills: 0 | Status: SHIPPED | OUTPATIENT
Start: 2019-06-24

## 2019-06-24 ASSESSMENT — ENCOUNTER SYMPTOMS
SINUS PAIN: 0
ABDOMINAL PAIN: 0
COUGH: 0
VOMITING: 0
SHORTNESS OF BREATH: 0

## 2019-06-24 NOTE — ED PROVIDER NOTES
indicated that her father is alive. She indicated that the status of her sister is unknown.   family history includes High Blood Pressure in her father; High Cholesterol in her father; Learning Disabilities in her sister; Other in her mother and sister. SOCIAL HISTORY      reports that she has never smoked. She has never used smokeless tobacco. She reports that she drinks alcohol. She reports that she does not use drugs. PHYSICAL EXAM     INITIAL VITALS:  height is 5' 8\" (1.727 m) and weight is 235 lb (106.6 kg). Her oral temperature is 97.2 °F (36.2 °C). Her blood pressure is 119/69 and her pulse is 86. Her respiration is 16 and oxygen saturation is 97%. Physical Exam   Constitutional: She appears well-developed and well-nourished. No distress. HENT:   Mouth/Throat: Oropharynx is clear and moist.   Eyes: No scleral icterus. Cardiovascular: Normal rate and regular rhythm. No murmur heard. Pulmonary/Chest: Effort normal and breath sounds normal.   Neurological: She is alert. Skin:   Skin examination showed multiple red maculopapules ranges from 0.5 to 1 cm in her abdomen and back, no surrounding cellulitic changes           DIAGNOSTIC RESULTS     LABS:   Labs Reviewed - No data to display    EMERGENCY DEPARTMENT COURSE:   Vitals:    Vitals:    06/24/19 1011   BP: 119/69   Pulse: 86   Resp: 16   Temp: 97.2 °F (36.2 °C)   TempSrc: Oral   SpO2: 97%   Weight: 235 lb (106.6 kg)   Height: 5' 8\" (1.727 m)       FINAL IMPRESSION      1. Insect bite of abdomen, initial encounter    2. Insect bite of back, unspecified laterality, initial encounter          DISPOSITION/PLAN   She was discharged home in good condition, she was given discharge instructions and was advised to return if worse or new symptoms.     PATIENT REFERRED TO:  Gloria Stevenson MD  [address] [facility]  [phone]    In 2 days        DISCHARGE MEDICATIONS:  Discharge Medication List as of 6/24/2019 10:27 AM START taking these medications    Details   triamcinolone (KENALOG) 0.1 % cream Apply topically 2 times daily for 1 week., Disp-1 Tube, R-0, Print             (Please note that portions of this note were completed with a voice recognition program.  Efforts were made to edit the dictations but occasionally words are mis-transcribed.)    MD Maria Dolores Thompson MD  06/24/19 2020 [facility] MD Ayana  06/24/19 1153

## 2019-06-24 NOTE — ED NOTES
Pt resting, resp even and unlabored, skin pink, warm and dry. Pt given discharge instructions and verbalizes understanding, pt released.      Bhargavi Cabrera RN  06/24/19 2392

## 2019-06-24 NOTE — ED NOTES
Pt states they were on vacation and she came back home and noticed multiple bug bites. Pt states she has been using over the counter medication without relief, bites are very itchy. Red bites noted on abdomen and back area. Pt denies fever, nausea or vomiting. Pt alert, resp even and unlabored, skin pink, warm and dry.       Cassy Mayen RN  06/24/19 8073

## 2019-07-26 ENCOUNTER — HOSPITAL ENCOUNTER (OUTPATIENT)
Dept: GENERAL RADIOLOGY | Age: 39
Discharge: HOME | End: 2019-07-26
Payer: COMMERCIAL

## 2019-07-26 ENCOUNTER — HOSPITAL ENCOUNTER (OUTPATIENT)
Age: 39
Discharge: HOME | End: 2019-07-26
Payer: COMMERCIAL

## 2019-07-26 DIAGNOSIS — M79.661 PAIN IN RIGHT LOWER LEG: ICD-10-CM

## 2019-07-26 PROCEDURE — 73590 X-RAY EXAM OF LOWER LEG: CPT

## 2019-08-02 ENCOUNTER — HOSPITAL ENCOUNTER (OUTPATIENT)
Dept: PHYSICAL THERAPY | Age: 39
Setting detail: THERAPIES SERIES
Discharge: HOME | End: 2019-08-02
Payer: COMMERCIAL

## 2019-08-02 PROCEDURE — 97035 APP MDLTY 1+ULTRASOUND EA 15: CPT

## 2019-08-02 PROCEDURE — 97161 PT EVAL LOW COMPLEX 20 MIN: CPT

## 2019-08-02 ASSESSMENT — PAIN SCALES - GENERAL: PAINLEVEL_OUTOF10: 2

## 2019-08-02 ASSESSMENT — PAIN DESCRIPTION - PROGRESSION: CLINICAL_PROGRESSION: GRADUALLY WORSENING

## 2019-08-02 ASSESSMENT — PAIN DESCRIPTION - FREQUENCY: FREQUENCY: CONTINUOUS

## 2019-08-02 ASSESSMENT — PAIN DESCRIPTION - LOCATION: LOCATION: LEG

## 2019-08-02 ASSESSMENT — PAIN DESCRIPTION - ORIENTATION: ORIENTATION: RIGHT

## 2019-08-02 ASSESSMENT — PAIN DESCRIPTION - DESCRIPTORS: DESCRIPTORS: CONSTANT

## 2019-08-05 ENCOUNTER — HOSPITAL ENCOUNTER (OUTPATIENT)
Dept: PHYSICAL THERAPY | Age: 39
Setting detail: THERAPIES SERIES
Discharge: HOME | End: 2019-08-05
Payer: COMMERCIAL

## 2019-08-05 PROCEDURE — 97035 APP MDLTY 1+ULTRASOUND EA 15: CPT

## 2019-08-05 PROCEDURE — 97110 THERAPEUTIC EXERCISES: CPT

## 2019-08-05 ASSESSMENT — PAIN SCALES - GENERAL: PAINLEVEL_OUTOF10: 3

## 2019-08-05 ASSESSMENT — PAIN DESCRIPTION - ORIENTATION: ORIENTATION: RIGHT

## 2019-08-05 ASSESSMENT — PAIN DESCRIPTION - LOCATION: LOCATION: LEG

## 2019-08-05 NOTE — PROGRESS NOTES
[facility]     Time In: 7551  Time Out: 0820  Minutes: 31  Timed Code Treatment Minutes: 31 Minutes                Date: 2019  Patient Name: Artist Rita,  Gender:  female        CSN: [id number]   : 1980  (46 y.o.)       Referring Practitioner: ERIKA Clements CNP      Diagnosis: M79.604 - Pain in right leg; S89.91XA- Unspecified injury of right lower leg, initial encounter  Treatment Diagnosis: right tibial pain, right ankle eversion and inversion weakness, impaired balance                      General:  PT Visit Information  Onset Date: 19  PT Insurance Information: [employer] [place]- no visit limit, aquatics and modalities covered  Total # of Visits to Date: 2  Plan of Care/Certification Expiration Date: 19  Progress Note Counter:  for PN. Subjective:  Chart Reviewed: Yes  Patient assessed for rehabilitation services?: Yes  Response To Previous Treatment: Patient with no complaints from previous session. Family / Caregiver Present: No  Comments: No follow up scheduled with CNP. Subjective: I played softball yesterday and it hurt later on in the eveneing. I play catcher and squat a lot. I felt a little beter after the US. Pain:  Patient Currently in Pain: Yes  Pain Level: 3  Pain Location: Leg  Pain Orientation: Right      Objective        Exercises  Exercise 1: Ultrasound to right mid tibia: 3.3MHz, 50% today (NT>20%), 0.8w/cm2, x8 min - no skin irritation or pain  Exercise 2: Sports Bike for warm up x 5 min L-1  Exercise 3: x10 toe/heel raises, squats   Exercise 4: rockerboard x 10 each way  balance x 30 sec   Exercise 5: ankle t band x 15 4 directions orange         Activity Tolerance:  Activity Tolerance: Patient Tolerated treatment well  Activity Tolerance: 3.5/10 after sesison. It's a little more sore. Assessment:   Body structures, Functions, Activity

## 2019-08-07 ENCOUNTER — APPOINTMENT (OUTPATIENT)
Dept: PHYSICAL THERAPY | Age: 39
End: 2019-08-07
Payer: COMMERCIAL

## 2019-08-13 ENCOUNTER — HOSPITAL ENCOUNTER (OUTPATIENT)
Dept: PHYSICAL THERAPY | Age: 39
Setting detail: THERAPIES SERIES
Discharge: HOME | End: 2019-08-13
Payer: COMMERCIAL

## 2019-08-13 PROCEDURE — 97110 THERAPEUTIC EXERCISES: CPT

## 2019-08-13 PROCEDURE — 97035 APP MDLTY 1+ULTRASOUND EA 15: CPT

## 2019-08-15 ENCOUNTER — HOSPITAL ENCOUNTER (OUTPATIENT)
Dept: PHYSICAL THERAPY | Age: 39
Setting detail: THERAPIES SERIES
Discharge: HOME | End: 2019-08-15
Payer: COMMERCIAL

## 2019-08-15 PROCEDURE — 97110 THERAPEUTIC EXERCISES: CPT

## 2019-08-15 PROCEDURE — 97035 APP MDLTY 1+ULTRASOUND EA 15: CPT

## 2019-08-15 ASSESSMENT — PAIN SCALES - GENERAL: PAINLEVEL_OUTOF10: 2

## 2019-08-15 ASSESSMENT — PAIN DESCRIPTION - ORIENTATION: ORIENTATION: RIGHT

## 2019-08-15 ASSESSMENT — PAIN DESCRIPTION - LOCATION: LOCATION: LEG

## 2019-08-22 ENCOUNTER — HOSPITAL ENCOUNTER (OUTPATIENT)
Dept: PHYSICAL THERAPY | Age: 39
Setting detail: THERAPIES SERIES
Discharge: HOME | End: 2019-08-22
Payer: COMMERCIAL

## 2019-08-22 PROCEDURE — 97035 APP MDLTY 1+ULTRASOUND EA 15: CPT

## 2019-08-22 PROCEDURE — 97110 THERAPEUTIC EXERCISES: CPT

## 2019-08-22 NOTE — PROGRESS NOTES
reported. Ended with ultrasound to decrease pain and inflammation. Prognosis: Good       Patient Education:  Patient Education: Continue with HEP. Plan:  Times per week: 2x  Plan weeks: 6 weeks  Current Treatment Recommendations: Strengthening, Balance Training, Gait Training, Stair training, Manual Therapy - Soft Tissue Mobilization, Modalities, Home Exercise Program, Patient/Caregiver Education & Training  Plan Comment: Continue per current POC. Goals:  Patient goals : Decrease right shin pain with activity    Short term goals  Time Frame for Short term goals: see LTGs d/t short expected plan duration    Long term goals  Time Frame for Long term goals : 6 weeks  Long term goal 1: Pt will report <2/10 right tibial pain after prolonged activity, i.e. playing softball. Long term goal 2: Pt will improve right ankle eversion and inversion strength to 5/5 without pain for stabilization with walking/running on uneven terrain. Long term goal 3: Pt will perform right SLS x30 sec on foam without pain and minimal to no sway for stability with mobility. Long term goal 4: Pt will be independent and compliant with HEP to achieve above goals.      Cindy Arriaza, PT

## 2019-08-29 ENCOUNTER — HOSPITAL ENCOUNTER (OUTPATIENT)
Dept: PHYSICAL THERAPY | Age: 39
Setting detail: THERAPIES SERIES
End: 2019-08-29
Payer: COMMERCIAL

## 2019-11-27 DIAGNOSIS — G43.119 INTRACTABLE MIGRAINE WITH AURA WITHOUT STATUS MIGRAINOSUS: Primary | ICD-10-CM

## 2019-11-27 RX ORDER — SUMATRIPTAN 50 MG/1
50 TABLET, FILM COATED ORAL
Qty: 9 TABLET | Refills: 0 | Status: SHIPPED | OUTPATIENT
Start: 2019-11-27 | End: 2019-12-04 | Stop reason: SDUPTHER

## 2019-12-04 DIAGNOSIS — G43.119 INTRACTABLE MIGRAINE WITH AURA WITHOUT STATUS MIGRAINOSUS: Primary | ICD-10-CM

## 2019-12-04 RX ORDER — SUMATRIPTAN 50 MG/1
50 TABLET, FILM COATED ORAL
Qty: 9 TABLET | Refills: 3 | Status: SHIPPED | OUTPATIENT
Start: 2019-12-04 | End: 2020-04-07 | Stop reason: SDUPTHER

## 2019-12-06 ENCOUNTER — OFFICE VISIT (OUTPATIENT)
Dept: NEUROLOGY | Age: 39
End: 2019-12-06
Payer: COMMERCIAL

## 2019-12-06 VITALS
HEART RATE: 100 BPM | SYSTOLIC BLOOD PRESSURE: 128 MMHG | BODY MASS INDEX: 39.05 KG/M2 | HEIGHT: 67 IN | DIASTOLIC BLOOD PRESSURE: 80 MMHG | WEIGHT: 248.8 LBS

## 2019-12-06 DIAGNOSIS — R53.83 OTHER FATIGUE: ICD-10-CM

## 2019-12-06 DIAGNOSIS — G43.119 INTRACTABLE MIGRAINE WITH AURA WITHOUT STATUS MIGRAINOSUS: Primary | ICD-10-CM

## 2019-12-06 PROCEDURE — 99213 OFFICE O/P EST LOW 20 MIN: CPT | Performed by: NURSE PRACTITIONER

## 2019-12-06 PROCEDURE — 1036F TOBACCO NON-USER: CPT | Performed by: NURSE PRACTITIONER

## 2019-12-06 PROCEDURE — G8484 FLU IMMUNIZE NO ADMIN: HCPCS | Performed by: NURSE PRACTITIONER

## 2019-12-06 PROCEDURE — G8427 DOCREV CUR MEDS BY ELIG CLIN: HCPCS | Performed by: NURSE PRACTITIONER

## 2019-12-06 PROCEDURE — G8417 CALC BMI ABV UP PARAM F/U: HCPCS | Performed by: NURSE PRACTITIONER
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
## 2020-02-12 ENCOUNTER — INITIAL CONSULT (OUTPATIENT)
Dept: PULMONOLOGY | Age: 40
End: 2020-02-12
Payer: COMMERCIAL

## 2020-02-12 VITALS
SYSTOLIC BLOOD PRESSURE: 116 MMHG | HEART RATE: 70 BPM | DIASTOLIC BLOOD PRESSURE: 74 MMHG | BODY MASS INDEX: 38.3 KG/M2 | OXYGEN SATURATION: 98 % | WEIGHT: 244 LBS | HEIGHT: 67 IN

## 2020-02-12 PROCEDURE — 99204 OFFICE O/P NEW MOD 45 MIN: CPT | Performed by: INTERNAL MEDICINE

## 2020-02-12 PROCEDURE — G8484 FLU IMMUNIZE NO ADMIN: HCPCS | Performed by: INTERNAL MEDICINE

## 2020-02-12 PROCEDURE — G8427 DOCREV CUR MEDS BY ELIG CLIN: HCPCS | Performed by: INTERNAL MEDICINE

## 2020-02-12 PROCEDURE — G8417 CALC BMI ABV UP PARAM F/U: HCPCS | Performed by: INTERNAL MEDICINE

## 2020-02-12 PROCEDURE — 1036F TOBACCO NON-USER: CPT | Performed by: INTERNAL MEDICINE

## 2020-02-12 RX ORDER — ZOLPIDEM TARTRATE 5 MG/1
5 TABLET ORAL NIGHTLY PRN
Qty: 1 TABLET | Refills: 0 | Status: SHIPPED | OUTPATIENT
Start: 2020-02-12 | End: 2020-02-13

## 2020-02-12 NOTE — PATIENT INSTRUCTIONS
Recommendations/Plan:  -Will schedule patient for polysomnogram in the sleep lab. -At the request of patient due to difficulty in sleeping in strange places ( including sleep lab), she was given a sleep aid i.e Ambien 5mg PO Qhs prn X1 to use on the day of sleep study. She was instructed to take Ambien only if needed on the day of test. She verbalizes understanding.  -I had a discussion with patient regarding avialable treatment options for her sleep disorder breathing including but not limited to CPAP titration in the sleep lab Vs.Dental appliance placement with referral to a local dentist Vs other available surgical options including Uvulopalatopharyngoplasty, maxillomandibular ostomy and tracheostomy as last option. At the end of discussion, she is not decided on her   treatment if she found to have obstructive sleep apnea at this time.  -We will see Loyd Young back in 1week after the sleep study to go over the sleep study results and further management options.  -She was educated to practice good sleep hygiene practices. She  was provided with a good sleep hygiene hand out.  -Ramiro was advised to make earlier appointment with my clinic if she develops any worsening of sleep symptoms. She verbalizes understanding.  -Ramiro was advised to not to drive any motor vehicles or operate heavy equipment until her sleep symptoms are under good control. Loyd Young verbalizes understanding.  -She was advised to loose weight by controlling diet and doing exercise once cleared by her family physician. - Loyd Young was educated about my impression and plan. She verbalizes understanding.

## 2020-03-01 ENCOUNTER — HOSPITAL ENCOUNTER (OUTPATIENT)
Dept: SLEEP CENTER | Age: 40
Discharge: HOME | End: 2020-03-03
Payer: COMMERCIAL

## 2020-03-01 PROCEDURE — 95810 POLYSOM 6/> YRS 4/> PARAM: CPT

## 2020-03-03 LAB — STATUS: NORMAL

## 2020-03-04 NOTE — PROGRESS NOTES
[address] [place] [address]                               SLEEP STUDY REPORT    PATIENT NAME: Coby Gan                  :        1980  MED REC NO:   [id number]                           ROOM:  ACCOUNT NO:   [de-identified]                           ADMIT DATE: 2020  PROVIDER:     Royal Rodas MD    DATE OF STUDY:  2020    REFERRING PROVIDERS:  Sepideh Parisi CNP; and Indu Villalobos MD    The patient's height is 67 inches, weight is 244 pounds with a BMI of  38.2. HISTORY:  The patient is a 41-year-old female who was initially  evaluated by me on 2020. The patient is currently suffering with  hypersomnia. The patient also gave a history of snoring without  witnessed apneas. The patient is scheduled for sleep study to evaluate  for sleep apnea as an etiology for hypersomnia. METHODS:  The patient underwent digital polysomnography in compliance  with the standards and specifications from the AASM Manual including the  simultaneous recording of 3 EEG channels (F4-M1, C4-M1, and O2-M1 with  back up electrodes F3-M2, C3-M2, and O1-M2), 2 EOG channels (E1-M2, and  E2-M1,), EMG (chin, left & right leg), EKG, Nonin pulse oximetry with   less than 2 second averaging time, body position, airflow recorded by  oral-nasal thermal sensor and nasal air pressure transducer, plus  respiratory effort recorded by calibrated respiratory inductance  plethysmography (RIP), flow volume loop, sound and video. Sleep staging  and scoring followed the standard put forth by the American Academy of  Sleep Medicine and utilized the 4A obstructive hypopnea event  desaturation of 4 percent or greater. INTERPRETATION:  This is a baseline sleep study and the study was  performed on 2020.   The study was started at 10:04 p.m. and was  terminated at 05:28 a.m. with a total recording time was 443.7 minutes,  sleep period time was 430.9 minutes, and total sleep time was 405  minutes. Overall sleep efficiency was 91.3%. The sleep onset latency  was 12.8 minutes and wake after sleep onset was 25.9 minutes. REM sleep  latency was 224 minutes. SLEEP STAGING AND DISTRIBUTION SUMMARY:  Revealed the patient spent 16  minutes in stage I consisting of 4% of total sleep time, 285.5 minutes  in stage II consisting of 70.5%, 38.5 minutes in stage III consisting of  9.5%, 65 minutes in REM sleep consisting of 16% of total sleep time. RESPIRATORY EVENT ANALYSIS:  Revealed the patient had a total of 4  apneas, all of them were obstructive in nature. The patient also had a  total of 38 hypopneas, all of them were obstructive in nature. The  total number of apneas and hypopneas recorded during the study were 42  with the apnea-hypopnea index of 6.2. The patient's REM sleep  apnea-hypopnea index was 0.9. POSITIONAL ANALYSIS:  Revealed the patient spent 275.4 minutes in supine  position and 123.2 minutes in left lateral position with supine  apnea-hypopnea index was 8.7, whereas left lateral position  apnea-hypopnea index was 1. PERIODIC LIMB MOVEMENT ANALYSIS:  Revealed the patient had a total of 1  periodic limb movement. The 1 is not associated with arousal with a PLM  index of 0.1. PLM arousal index is 0. The patient had a total of 36  spontaneous arousals with a spontaneous arousal index of 5.3. OXYGEN SATURATION MONITORING:  Revealed the patient had a maximum oxygen  desaturation to 87% with a mean oxygen saturation of 93%. The patient  spent a total of 0.3 minutes below oxygen saturation less than 88%. EKG MONITORING:  Revealed the patient had poor quality EKG recording;  however, the rhythm appears to be regular. The patient had a mild-to-moderate snoring during the sleep study. IMPRESSION:  1. Mild obstructive sleep apnea with worsening of respiratory events in  supine position.   2.

## 2020-04-01 PROBLEM — G47.33 OSA (OBSTRUCTIVE SLEEP APNEA): Status: ACTIVE | Noted: 2020-04-01

## 2020-04-02 ENCOUNTER — TELEMEDICINE (OUTPATIENT)
Dept: PULMONOLOGY | Age: 40
End: 2020-04-02
Payer: COMMERCIAL

## 2020-04-02 PROBLEM — E66.9 OBESITY (BMI 30-39.9): Status: ACTIVE | Noted: 2020-04-02

## 2020-04-02 PROCEDURE — G8428 CUR MEDS NOT DOCUMENT: HCPCS | Performed by: PHYSICIAN ASSISTANT

## 2020-04-02 PROCEDURE — G8417 CALC BMI ABV UP PARAM F/U: HCPCS | Performed by: PHYSICIAN ASSISTANT

## 2020-04-02 PROCEDURE — 99213 OFFICE O/P EST LOW 20 MIN: CPT | Performed by: PHYSICIAN ASSISTANT

## 2020-04-02 PROCEDURE — 1036F TOBACCO NON-USER: CPT | Performed by: PHYSICIAN ASSISTANT

## 2020-04-02 ASSESSMENT — ENCOUNTER SYMPTOMS
NAUSEA: 0
ALLERGIC/IMMUNOLOGIC NEGATIVE: 1
DIARRHEA: 0
BACK PAIN: 0
COUGH: 0
SHORTNESS OF BREATH: 0
STRIDOR: 0
EYES NEGATIVE: 1
CHEST TIGHTNESS: 0
WHEEZING: 0

## 2020-04-02 NOTE — PROGRESS NOTES
[facility]    Kristy Rush, 46 y.o.  [id number]      Pt of [facility]  Nurses Notes   Here for follow up PSG  Study Results  Initial Study Date -  3/1/20  AHI -  6.2    Total Events - 42  (Apneas  4  Hypopneas 38  Central  0)  LM w/Arousals - 0  Sleep Efficiency - 91.3 % (Total Sleep Time - 405 min)  Time with Sats below 88% -  min  Interval History       Kristy Rush is a 46 y.o. old female who comes in to review the results of her recent sleep study, to answer questions and to explore options for treatment. she continues to have symptoms of snoring but mild per . PMHx  Past Medical History:   Diagnosis Date    Fibroid uterus 2018    Infertility, female      Past Surgical History:   Procedure Laterality Date    ADRENALECTOMY  2012    Removal of cyst on Adrenal Gland     SECTION  2011     SECTION  2011     SECTION  2014    KNEE SURGERY Right 1998    TYMPANOSTOMY TUBE PLACEMENT  age 4   Thomasena [facility] WISDOM TOOTH EXTRACTION       Social History     Tobacco Use    Smoking status: Never Smoker    Smokeless tobacco: Never Used   Substance Use Topics    Alcohol use: Yes     Comment: socially    Drug use: No     Family History   Problem Relation Age of Onset    High Cholesterol Father     High Blood Pressure Father     Other Mother     Learning Disabilities Sister     Other Sister      Allergies  No Known Allergies  Meds  Current Outpatient Medications   Medication Sig Dispense Refill    SUMAtriptan (IMITREX) 50 MG tablet Take 1 tablet by mouth once as needed for Migraine 9 tablet 3    triamcinolone (KENALOG) 0.1 % cream Apply topically 2 times daily for 1 week.  1 Tube 0    cetirizine (ZYRTEC) 10 MG tablet Take 10 mg by mouth daily      Multiple Vitamins-Minerals (THERAPEUTIC MULTIVITAMIN-MINERALS) tablet Take 1 tablet by mouth daily       No current facility-administered medications for this Recommendations  PSG positive for sleep apnea but mild  Reviewed PSG results with her  She is  Not tired during the day, mild snoring per . Several options for treatment were discussed including positional therapy, OAT and CPAP. The benefits and limitations of each were discussed. After addressing her  concerns, [facility]  decided to move ahead with positional therapy  Her AHI on her side is only 1  Do not suspect SHAINA the cause of her migraines  RTC if symptoms increase such as louder snoring or develops hypersomnia           Liliam Minor  4/2/2020          [facility] is being evaluated by a Virtual Visit (video visit) encounter to address concerns as mentioned above. A caregiver was present when appropriate. Due to this being a TeleHealth encounter (During MMYBO-02 public health emergency), evaluation of the following organ systems was limited: Vitals/Constitutional/EENT/Resp/CV/GI//MS/Neuro/Skin/Heme-Lymph-Imm. Pursuant to the emergency declaration under the [address] [place] [address] and the [employer] and [employer] Act, this Virtual Visit was conducted with patient's (and/or legal guardian's) consent, to reduce the patient's risk of exposure to COVID-19 and provide necessary medical care. The patient (and/or legal guardian) has also been advised to contact this office for worsening conditions or problems, and seek emergency medical treatment and/or call 911 if deemed necessary. visit time was 15 min  Services were provided through a video synchronous discussion virtually to substitute for in-person clinic visit. Patient and provider were located at their individual homes. Med White PA-C, MPAS  4/2/2020      An electronic signature was used to authenticate this note.

## 2020-04-07 ENCOUNTER — VIRTUAL VISIT (OUTPATIENT)
Dept: NEUROLOGY | Age: 40
End: 2020-04-07
Payer: COMMERCIAL

## 2020-04-07 PROCEDURE — G8428 CUR MEDS NOT DOCUMENT: HCPCS | Performed by: PSYCHIATRY & NEUROLOGY

## 2020-04-07 PROCEDURE — 99213 OFFICE O/P EST LOW 20 MIN: CPT | Performed by: PSYCHIATRY & NEUROLOGY

## 2020-04-07 RX ORDER — SUMATRIPTAN 50 MG/1
50 TABLET, FILM COATED ORAL
Qty: 9 TABLET | Refills: 8 | Status: ON HOLD | OUTPATIENT
Start: 2020-04-07 | End: 2021-04-13

## 2020-04-07 ASSESSMENT — ENCOUNTER SYMPTOMS
RESPIRATORY NEGATIVE: 1
GASTROINTESTINAL NEGATIVE: 1

## 2020-04-07 NOTE — PATIENT INSTRUCTIONS
Continue Imitrex 50 mg daily as needed for breakthrough headaches, refills given.     Report any change in headache symptoms  Follow-up in 8 months sooner if needed

## 2020-04-07 NOTE — PROGRESS NOTES
current medication regimen. She reports Imitrex helps with breakthrough headaches. She denies any side effects of the medication. She does not feel she needs preventive medication at this time. Her sleep study was satisfactory. Also on B12 supplements. She is requesting refills for the Imitrex 50 mg daily as needed for headache. After detailed discussion with patient we agreed on the following plan. Continue Imitrex 50 mg daily as needed for breakthrough headaches, refills given. Report any change in headache symptoms  Follow-up in 8 months sooner if needed      Return in about 8 months (around 12/7/2020). Bessy Hardwick is a 46 y.o. female being evaluated by a Virtual Visit (video visit) encounter to address concerns as mentioned above. A caregiver was present when appropriate. Due to this being a TeleHealth encounter (During XPS-11 public health emergency), evaluation of the following organ systems was limited: Vitals/Constitutional/EENT/Resp/CV/GI//MS/Neuro/Skin/Heme-Lymph-Imm. Pursuant to the emergency declaration under the [address] [place] [address], [address] [place] [address] authority and the [employer] and [employer] Act, this Virtual Visit was conducted with patient's (and/or legal guardian's) consent, to reduce the patient's risk of exposure to COVID-19 and provide necessary medical care. The patient (and/or legal guardian) has also been advised to contact this office for worsening conditions or problems, and seek emergency medical treatment and/or call 911 if deemed necessary. Services were provided through a video synchronous discussion virtually to substitute for in-person clinic visit. Patient and provider were located at their individual homes. --Stas Fleming MD on 4/7/2020 at 12:58 PM    An electronic signature was used to authenticate this note.

## 2020-04-20 ENCOUNTER — HOSPITAL ENCOUNTER (EMERGENCY)
Age: 40
Discharge: HOME | End: 2020-04-20
Payer: COMMERCIAL

## 2020-04-20 VITALS
BODY MASS INDEX: 36.37 KG/M2 | WEIGHT: 240 LBS | TEMPERATURE: 97.8 F | HEIGHT: 68 IN | SYSTOLIC BLOOD PRESSURE: 132 MMHG | OXYGEN SATURATION: 96 % | HEART RATE: 88 BPM | DIASTOLIC BLOOD PRESSURE: 81 MMHG | RESPIRATION RATE: 18 BRPM

## 2020-04-20 PROCEDURE — 6360000002 HC RX W HCPCS: Performed by: NURSE PRACTITIONER

## 2020-04-20 PROCEDURE — 90471 IMMUNIZATION ADMIN: CPT | Performed by: NURSE PRACTITIONER

## 2020-04-20 PROCEDURE — 90715 TDAP VACCINE 7 YRS/> IM: CPT | Performed by: NURSE PRACTITIONER

## 2020-04-20 PROCEDURE — 99213 OFFICE O/P EST LOW 20 MIN: CPT | Performed by: NURSE PRACTITIONER

## 2020-04-20 PROCEDURE — 2500000003 HC RX 250 WO HCPCS: Performed by: NURSE PRACTITIONER

## 2020-04-20 PROCEDURE — 99212 OFFICE O/P EST SF 10 MIN: CPT

## 2020-04-20 PROCEDURE — 12001 RPR S/N/AX/GEN/TRNK 2.5CM/<: CPT

## 2020-04-20 RX ORDER — LIDOCAINE HYDROCHLORIDE 20 MG/ML
5 INJECTION, SOLUTION INFILTRATION; PERINEURAL ONCE
Status: COMPLETED | OUTPATIENT
Start: 2020-04-20 | End: 2020-04-20

## 2020-04-20 RX ORDER — ACETAMINOPHEN 650 MG
TABLET, EXTENDED RELEASE ORAL PRN
Status: DISCONTINUED | OUTPATIENT
Start: 2020-04-20 | End: 2020-04-20 | Stop reason: HOSPADM

## 2020-04-20 RX ADMIN — LIDOCAINE HYDROCHLORIDE 5 ML: 20 INJECTION, SOLUTION INFILTRATION; PERINEURAL at 19:19

## 2020-04-20 RX ADMIN — TETANUS TOXOID, REDUCED DIPHTHERIA TOXOID AND ACELLULAR PERTUSSIS VACCINE, ADSORBED 0.5 ML: 5; 2.5; 8; 8; 2.5 SUSPENSION INTRAMUSCULAR at 18:29

## 2020-04-20 ASSESSMENT — ENCOUNTER SYMPTOMS
APNEA: 0
COLOR CHANGE: 0
STRIDOR: 0
WHEEZING: 0
COUGH: 0
CHEST TIGHTNESS: 0
SHORTNESS OF BREATH: 0
CHOKING: 0

## 2020-04-20 ASSESSMENT — PAIN SCALES - GENERAL
PAINLEVEL_OUTOF10: 2
PAINLEVEL_OUTOF10: 0

## 2020-04-20 ASSESSMENT — PAIN DESCRIPTION - ORIENTATION: ORIENTATION: RIGHT

## 2020-04-20 ASSESSMENT — PAIN DESCRIPTION - PAIN TYPE: TYPE: ACUTE PAIN

## 2020-04-20 ASSESSMENT — PAIN DESCRIPTION - DESCRIPTORS: DESCRIPTORS: ACHING

## 2020-04-20 NOTE — ED NOTES
Wound cleansed with wound wash. approx 1 cm laceration morales surface proximal phalange.       Telma Al RN  04/20/20 [id number]

## 2020-07-27 ENCOUNTER — HOSPITAL ENCOUNTER (OUTPATIENT)
Age: 40
Discharge: HOME | End: 2020-07-27
Payer: COMMERCIAL

## 2020-07-27 PROCEDURE — 99211 OFF/OP EST MAY X REQ PHY/QHP: CPT

## 2020-07-27 PROCEDURE — U0003 INFECTIOUS AGENT DETECTION BY NUCLEIC ACID (DNA OR RNA); SEVERE ACUTE RESPIRATORY SYNDROME CORONAVIRUS 2 (SARS-COV-2) (CORONAVIRUS DISEASE [COVID-19]), AMPLIFIED PROBE TECHNIQUE, MAKING USE OF HIGH THROUGHPUT TECHNOLOGIES AS DESCRIBED BY CMS-2020-01-R: HCPCS

## 2020-07-29 LAB — SARS-COV-2: NOT DETECTED

## 2020-12-07 ENCOUNTER — VIRTUAL VISIT (OUTPATIENT)
Dept: NEUROLOGY | Age: 40
End: 2020-12-07
Payer: COMMERCIAL

## 2020-12-07 PROCEDURE — 99213 OFFICE O/P EST LOW 20 MIN: CPT | Performed by: PSYCHIATRY & NEUROLOGY

## 2020-12-07 PROCEDURE — G8428 CUR MEDS NOT DOCUMENT: HCPCS | Performed by: PSYCHIATRY & NEUROLOGY

## 2020-12-07 ASSESSMENT — ENCOUNTER SYMPTOMS
RESPIRATORY NEGATIVE: 1
GASTROINTESTINAL NEGATIVE: 1

## 2020-12-07 NOTE — PROGRESS NOTES
2020    TELEHEALTH EVALUATION -- Audio/Visual (During SIRWT-63 public health emergency)    HPI:    Michelle Madsen (:  1980) has requested an audio/video evaluation for the following concern(s):  The patient is followed for migraine headaches. She just found out she is pregnant. She reports Imitrex helps with breakthrough headaches, however she is not taking it due to being pregnant. She is advised to take MgOxide. Her sleep study was satisfactory. She is on video presence to discuss plan of care going forward. Review of Systems   Respiratory: Negative. Gastrointestinal: Negative. Musculoskeletal: Negative. Prior to Visit Medications    Medication Sig Taking? Authorizing Provider   SUMAtriptan (IMITREX) 50 MG tablet Take 1 tablet by mouth once as needed for Migraine  Charmaine Severs, MD   triamcinolone (KENALOG) 0.1 % cream Apply topically 2 times daily for 1 week.   Cat Rao MD   cetirizine (ZYRTEC) 10 MG tablet Take 10 mg by mouth daily  Historical Provider, MD   Multiple Vitamins-Minerals (THERAPEUTIC MULTIVITAMIN-MINERALS) tablet Take 1 tablet by mouth daily  Historical Provider, MD       Social History     Tobacco Use    Smoking status: Never Smoker    Smokeless tobacco: Never Used   Substance Use Topics    Alcohol use: Yes     Comment: socially    Drug use: No        Past Medical History:   Diagnosis Date    Fibroid uterus 2018    Infertility, female    ,   Past Surgical History:   Procedure Laterality Date    ADRENALECTOMY  2012    Removal of cyst on Adrenal Gland     SECTION  2011     SECTION  2011     SECTION  2014    KNEE SURGERY Right 1998    TYMPANOSTOMY TUBE PLACEMENT  age 4   [address]   ,   Social History     Tobacco Use    Smoking status: Never Smoker    Smokeless tobacco: Never Used   Substance Use Topics    Alcohol use: Yes     Comment: socially    Drug use: No   ,   Family History   Problem Relation Age of Onset    High Cholesterol Father     High Blood Pressure Father     Other Mother     Learning Disabilities Sister     Other Sister        PHYSICAL EXAMINATION:  [ INSTRUCTIONS:  \"[x]\" Indicates a positive item  \"[]\" Indicates a negative item  -- DELETE ALL ITEMS NOT EXAMINED]  Vital Signs: (As obtained by patient/caregiver or practitioner observation)    Blood pressure-  Heart rate-    Respiratory rate-    Temperature-  Pulse oximetry-     Constitutional: [x] Appears well-developed and well-nourished [x] No apparent distress      [] Abnormal-   Mental status  [x] Alert and awake  [x] Oriented to person/place/time []Able to follow commands      Eyes:  EOM    [x]  Normal  [] Abnormal-  Sclera  [x]  Normal  [] Abnormal -         Discharge [x]  None visible  [] Abnormal -    HENT:   [x] Normocephalic, atraumatic. [] Abnormal   [x] Mouth/Throat: Mucous membranes are moist.     External Ears [x] Normal  [] Abnormal-     Neck: [x] No visualized mass     Pulmonary/Chest: [x] Respiratory effort normal.  [x] No visualized signs of difficulty breathing or respiratory distress        [] Abnormal-      Musculoskeletal:   [x] Normal gait with no signs of ataxia         [x] Normal range of motion of neck        [] Abnormal-       Neurological:        [x] No Facial Asymmetry (Cranial nerve 7 motor function) (limited exam to video visit)          [x] No gaze palsy        [] Abnormal-         Skin:        [x] No significant exanthematous lesions or discoloration noted on facial skin         [] Abnormal-            Psychiatric:       [x] Normal Affect [x] No Hallucinations        [] Abnormal-     Other pertinent observable physical exam findings-     ASSESSMENT/PLAN:   Diagnosis Orders   1. Migraine with aura and without status migrainosus, not intractable        The patient is doing well with her current medication regimen. She just found out she is pregnant.   She reports Imitrex helps with breakthrough headaches, however she is not taking it due to being pregnant. She is advised to take MgOxide. Her sleep study was satisfactory. She is requesting refills for the Imitrex 50 mg daily as needed for headache. After detailed discussion with patient we agreed on the following plan. 1. May use magnesium oxide for headaches  2. May use Imitrex 50 mg daily as needed for severe breakthrough headaches. 3. Report any change in headache symptoms. 4. Follow-up in 8 months sooner if needed      Return in about 8 months (around 8/7/2021). Sage Thompson is a 45 y.o. female being evaluated by a Virtual Visit (video visit) encounter to address concerns as mentioned above. A caregiver was present when appropriate. Due to this being a TeleHealth encounter (During TVIFG-90 public health emergency), evaluation of the following organ systems was limited: Vitals/Constitutional/EENT/Resp/CV/GI//MS/Neuro/Skin/Heme-Lymph-Imm. Pursuant to the emergency declaration under the [address] [place] [address] and the [employer] and [employer] Act, this Virtual Visit was conducted with patient's (and/or legal guardian's) consent, to reduce the patient's risk of exposure to COVID-19 and provide necessary medical care. The patient (and/or legal guardian) has also been advised to contact this office for worsening conditions or problems, and seek emergency medical treatment and/or call 911 if deemed necessary. Services were provided through a video synchronous discussion virtually to substitute for in-person clinic visit. Patient and provider were located at their individual homes. --Gayatri Xiong MD on 12/7/2020 at 1:00 PM    An electronic signature was used to authenticate this note.

## 2020-12-07 NOTE — PATIENT INSTRUCTIONS
1. May use magnesium oxide for headaches  2. May use Imitrex 50 mg daily as needed for severe breakthrough headaches. 3. Report any change in headache symptoms.   4. Follow-up in 8 months sooner if needed

## 2021-04-13 ENCOUNTER — HOSPITAL ENCOUNTER (OUTPATIENT)
Age: 41
Discharge: HOME | End: 2021-04-13
Attending: OBSTETRICS & GYNECOLOGY | Admitting: OBSTETRICS & GYNECOLOGY
Payer: COMMERCIAL

## 2021-04-13 VITALS
HEART RATE: 88 BPM | RESPIRATION RATE: 18 BRPM | OXYGEN SATURATION: 97 % | TEMPERATURE: 98.4 F | WEIGHT: 250 LBS | DIASTOLIC BLOOD PRESSURE: 70 MMHG | BODY MASS INDEX: 37.89 KG/M2 | HEIGHT: 68 IN | SYSTOLIC BLOOD PRESSURE: 120 MMHG

## 2021-04-13 PROBLEM — O99.891 PREGNANCY COMPLICATION BEFORE BIRTH: Status: ACTIVE | Noted: 2021-04-13

## 2021-04-13 LAB
ANION GAP SERPL CALCULATED.3IONS-SCNC: 8 MEQ/L (ref 8–16)
BUN BLDV-MCNC: 9 MG/DL (ref 7–22)
CALCIUM SERPL-MCNC: 9.2 MG/DL (ref 8.5–10.5)
CHLORIDE BLD-SCNC: 103 MEQ/L (ref 98–111)
CO2: 25 MEQ/L (ref 23–33)
CREAT SERPL-MCNC: 0.6 MG/DL (ref 0.4–1.2)
GFR SERPL CREATININE-BSD FRML MDRD: > 90 ML/MIN/1.73M2
GLUCOSE BLD-MCNC: 85 MG/DL (ref 70–108)
MAGNESIUM: 2.3 MG/DL (ref 1.6–2.4)
POTASSIUM SERPL-SCNC: 4.1 MEQ/L (ref 3.5–5.2)
SODIUM BLD-SCNC: 136 MEQ/L (ref 135–145)

## 2021-04-13 PROCEDURE — 83735 ASSAY OF MAGNESIUM: CPT

## 2021-04-13 PROCEDURE — 80048 BASIC METABOLIC PNL TOTAL CA: CPT

## 2021-04-13 PROCEDURE — 36415 COLL VENOUS BLD VENIPUNCTURE: CPT

## 2021-04-13 RX ORDER — ASPIRIN 81 MG/1
81 TABLET ORAL DAILY
COMMUNITY

## 2021-04-13 NOTE — FLOWSHEET NOTE
Gr 5 P 2 admitted to labor and delivery with complaints of SOB, down for a while and was dizzy and had SOB when she got back up. States her watch said her heartt rate was 162 and her fingers were tingling. States baby is moving well. States she ate breakfast and has had about 30 oz water today.  EFM applied to soft non tender abdomen

## 2021-04-13 NOTE — FLOWSHEET NOTE
notified of admission and pt complaints of SOB, dizziness and heart rate at home of 162. Had breakfast and has been drinking plenty of water. Reactive strip. No contractions. informed that she got a message from the office that she did not pass her glucose test yesterday. Orders rec'd.

## 2021-04-13 NOTE — PLAN OF CARE
Problem: Healthcare acquired conditions:  Goal: Absence of healthcare acquired conditions  Description: Absence of healthcare acquired conditions  Note: Pt stable. Care plan reviewed with patient. Patient verbalize understanding of the plan of care and contribute to goal setting.

## 2021-05-30 NOTE — PROCEDURES
Department of Obstetrics and Gynecology  Labor and Delivery   Triage Note  Usha Germain D.O.      Pt Name: Kathy Ramirez  MRN: [id number] Marck #: [de-identified]  YOB: 1980  Date of Service 21    SUBJECTIVE: This 43yo  patient presented at 29 week+1 complaining of dizziness and SOB. OBJECTIVE    Fetal heart rate:         Baseline Heart Rate:  145        Accelerations:  present       Decelerations:  none       Variability:  moderate    Contraction frequency: nil    The NST was reactive level 1      ASSESSMENT & PLAN:  Discharged to home in a stable condition and instructed to follow up at her next scheduled appointment.     Rebeka Villanueva DO D.O.

## 2021-06-28 NOTE — H&P
Women's [facility] for [address] [facility]    Patient Name: Roldan Malcolm   Patient ID: [id number]   Sex: Female   YOB: 1980         Create Date: 2021                   History Of Present Illness   This is a 45year old /White female, , who is at 44 weeks gestation with an LM of 2021 presenting for Repeat  Section. Patient has a medical history significant for COVID-19, history of, History of placental abruption, and Previous  delivery, antepartum. Her pregnancy has been complicated by previous  section x 2, hx of placental abruption in previous pregnancy, obesity, AMA, GDMA1   Patient admits fetal movements and denies contractions and leaking of fluid.          Past Medical History   Disease Name Date Onset Notes   Advanced maternal age in multigravida, third trimester 2021 --    COVID-19, history of --  --    Diet controlled gestational diabetes mellitus (GDM) in third trimester 2021 --    Dysmenorrhea 10/10/2018 --    High-risk pregnancy in third trimester 2021 --    History of placental abruption 2021 --    Large for dates complicating pregnancy in third trimester, antepartum 2021 --    Menorrhagia with irregular cycle 10/10/2018 --    Obesity affecting pregnancy in third trimester 2021 --    Previous  delivery, antepartum 2021 --            Past Surgical History   Procedure Name Date Notes   Adrenalectomy --  Rt    --  3    Ear Surgery --  tubes in ears    Knee Surgery --  arthroscopic rt knee    [place] Teeth --  --            Medication List   Name Date Started Instructions   aspirin 81 mg tablet,delayed release  take 1 tablet (81 mg) by oral route once daily   Blood Glucose Monitoring kit 2021 Dispense Glucometer that is covered by ins   Blood Glucose Test strips 2021 Check bs 4 times daily; fasting and 1 hr after each meal   Glucophage  mg tablet,extended  --/-- --  --    IV drug abuse Never --/-- --  01/28/2020 -    Lifetime partners --  --/-- --  01/28/2020 - less than 6     --  --/-- --  32/04/0550 -    No abuse of any substances Never --/-- --  53/37/7262 -    Other --  --/-- --  01/28/2020 - [employer] in the last 6 months --  --/-- --  01/28/2020 - 1   Tobacco Former --/22 --  66/39/2701 -    Uses seat belts --  --/-- --  --            Immunizations   NameDate Gabrielle Elias [employer] Trade Name Lot Number Route Inj VIS Given VIS Publication   [id number] [address] [place] [address] Drive 618397 IM LD 12/03/2020    Comments:            Review of Systems   ConstitutionalDenies : fever, body aches, weight loss, additional symptoms except as noted in the HPI   EyesDenies : impaired vision, additional symptoms except as noted in the HPI   HENTDenies : headaches, sinus congestion, neck stiffness, sore throat, decreased hearing, additional symptoms except as noted in the HPI   BreastsDenies : nipple discharge, additional symptoms except as noted in the HPI   CardiovascularDenies : chest pain, irregular heart beats, syncope, lower extremity edema, palpitations, additional symptoms except as noted in the HPI   RespiratoryDenies : shortness of breath, wheezing, cough, additional symptoms except as noted in the HPI   GastrointestinalAdmits : constipation   Denies : nausea, vomiting, diarrhea, reflux, abdominal pain, blood in stools, additional symptoms except as noted in the HPI   GenitourinaryDenies : urgency, frequency, dysuria, incontinence, additional symptoms except as noted in the HPI   IntegumentDenies : rash, changes to existing skin lesions or moles, additional symptoms except as noted in the HPI   NeurologicDenies : muscular weakness, incoordination, tingling or numbness, headache, additional symptoms except as noted in the HPI   MusculoskeletalDenies : joint pain, muscle pain, additional symptoms except as noted in the HPI   EndocrineDenies : cold intolerance, heat intolerance, additional symptoms except as noted in the HPI   PsychiatricDenies : addtional symptoms except as noted in the HPI   Heme-LymphDenies : easy bruising, lymph node enlargement or tenderness, addtional symptoms except as noted in the HPI   Allergic-ImmunologicDenies : frequent illnesses, addtional symptoms except as noted in the HPI       Vitals   Date Time BP Position Site L\R Cuff Size HR RR TEMP (F) WT  HT  BMI kg/m2 BSA m2 O2 Sat FR L/min FiO2 HC       06/09/2021 09:09 /66 Sitting       243lbs 16oz 5'  8\" 37.1 2.3               Physical Examination   ConstitutionalAppearance : obese, well developed, alert, in no acute distress   EyesConjunctiva/Eyelids : conjunctiva normal, eyelid appearance normal   Sclera : sclera white   HENTHead and Face :   Head : normocephalic, atraumatic   Ears :   External Ears : external ears within normal limits   Hearing : hearing intact bilaterally   Nose :   External Nose : external nose normal in appearance, nares patent, nasal discharge absent   Mouth :   General : appearance normal   Lips : lip appearance normal   NeckInspection/Palpation : normal appearance, no masses or tenderness   Lymph Nodes : no lymphadenopathy present   Range of Motion : neck supple with full range of motion   Thyroid : gland size normal, nontender, no nodules or masses present on palpation   ChestRespiratory Effort : breathing unlabored   Auscultation : normal breath sounds, no rales, no rhonchi   CardiovascularHeart :    Auscultation : regular rate, normal rhythm, no murmurs present   Peripheral Vascular System :   Femoral Arteries : normal femoral pulses, no bruits present   Peripheral Circulation : no edema, no cyanosis   BreastsInspection of Breasts : Breasts symmetrical, no skin changes, no discharge present   Palpation of Breasts and Axillae : No masses present on palpation, no breast tenderness   Axillary Lymph Nodes : no lymphadenopathy present GastrointestinalAbdominal Examination : abdomen nontender to palpation, normal bowel sounds, tone normal without rigidity or guarding, no masses present, umbilicus without lesions   Liver and spleen : no hepatomegaly present, liver nontender to palpation   Hernias : no hernias present   GenitourinaryExternal Genitalia : normal appearance for age, no discharge present, no tenderness present, no inflammatory lesions present   Vagina : normal vaginal vault without central or paravaginal defects, no discharge present, no inflammatory lesions present, no masses present   Bladder : nontender to palpation   Urethra :   Urethral Body : urethra palpation normal, urethra structural support normal   Urethral Meatus : no erythema or lesions present   Cervix : appearance healthy, no lesions present, nontender to palpation, no bleeding present   Uterus : nontender to palpation, no masses present, position midline/midplane, mobility: normal   Adnexa : no adnexal tenderness present, no adnexal masses present   Perineum : perineum within normal limits, no evidence of trauma, no rashes or skin lesions present   Anus : anus within normal limits, no hemorrhoids present   Inguinal Lymph Nodes : no lymphadenopathy present   LymphaticLymph Nodes : no other lymphadenopathy present   SkinGeneral Inspection : no rashes present, no lesions present, no areas of discoloration   Body Hair : general body hair distribution normal   Pubic Hair : normal pubic hair distribution for age   Genitalia and Groin : no rashes present, no lesions present, no areas of discoloration, no masses present   Neurologic/PsychiatricMental Status :   Orientation : grossly oriented to person, place and time   Mood and Affect : mood normal, affect appropriate   Cranial Nerves : cranial nerves intact bilaterally           Assessment   Advanced maternal age in multigravida, third trimester     659.63/O09.523    Diet controlled gestational diabetes mellitus (GDM) in third trimester     648.83/O24.410    High-risk pregnancy in third trimester     V23.9/O09.93    History of placental abruption     V13.29/Z87.59    Large for dates complicating pregnancy in third trimester, antepartum     656.63/O36.63X0    Obesity affecting pregnancy in third trimester     278.00/O99.213    Previous  delivery, antepartum     V23.41/O09.219    39 weeks gestation of pregnancy     V22.2/Z3A.39        Plan      Repeat  Section                 Electronically Signed by: Reyes Galeas.  Caroline Hillman on 2021 07:31:52 AM

## 2021-06-29 ENCOUNTER — HOSPITAL ENCOUNTER (INPATIENT)
Age: 41
LOS: 2 days | Discharge: HOME | End: 2021-07-01
Attending: OBSTETRICS & GYNECOLOGY | Admitting: OBSTETRICS & GYNECOLOGY
Payer: COMMERCIAL

## 2021-06-29 ENCOUNTER — ANESTHESIA (OUTPATIENT)
Dept: LABOR AND DELIVERY | Age: 41
End: 2021-06-29
Payer: COMMERCIAL

## 2021-06-29 ENCOUNTER — ANESTHESIA EVENT (OUTPATIENT)
Dept: LABOR AND DELIVERY | Age: 41
End: 2021-06-29
Payer: COMMERCIAL

## 2021-06-29 VITALS — OXYGEN SATURATION: 98 % | DIASTOLIC BLOOD PRESSURE: 55 MMHG | SYSTOLIC BLOOD PRESSURE: 100 MMHG

## 2021-06-29 DIAGNOSIS — O99.891 PREGNANCY COMPLICATION BEFORE BIRTH: ICD-10-CM

## 2021-06-29 LAB
ABO: NORMAL
AMPHETAMINE+METHAMPHETAMINE URINE SCREEN: NEGATIVE
ANTIBODY SCREEN: NORMAL
BARBITURATE QUANTITATIVE URINE: NEGATIVE
BENZODIAZEPINE QUANTITATIVE URINE: NEGATIVE
CANNABINOID QUANTITATIVE URINE: NEGATIVE
COCAINE METABOLITE QUANTITATIVE URINE: NEGATIVE
ERYTHROCYTE [DISTWIDTH] IN BLOOD BY AUTOMATED COUNT: 13.8 % (ref 11.5–14.5)
ERYTHROCYTE [DISTWIDTH] IN BLOOD BY AUTOMATED COUNT: 45.7 FL (ref 35–45)
HCT VFR BLD CALC: 32.4 % (ref 37–47)
HEMOGLOBIN: 10.8 GM/DL (ref 12–16)
MCH RBC QN AUTO: 30.3 PG (ref 26–33)
MCHC RBC AUTO-ENTMCNC: 33.3 GM/DL (ref 32.2–35.5)
MCV RBC AUTO: 91 FL (ref 81–99)
OPIATES, URINE: NEGATIVE
OXYCODONE: NEGATIVE
PHENCYCLIDINE QUANTITATIVE URINE: NEGATIVE
PLATELET # BLD: 217 THOU/MM3 (ref 130–400)
PMV BLD AUTO: 10.2 FL (ref 9.4–12.4)
RBC # BLD: 3.56 MILL/MM3 (ref 4.2–5.4)
RH FACTOR: NORMAL
RPR: NONREACTIVE
WBC # BLD: 6.2 THOU/MM3 (ref 4.8–10.8)

## 2021-06-29 PROCEDURE — 2580000003 HC RX 258: Performed by: NURSE ANESTHETIST, CERTIFIED REGISTERED

## 2021-06-29 PROCEDURE — 86850 RBC ANTIBODY SCREEN: CPT

## 2021-06-29 PROCEDURE — 85027 COMPLETE CBC AUTOMATED: CPT

## 2021-06-29 PROCEDURE — 86592 SYPHILIS TEST NON-TREP QUAL: CPT

## 2021-06-29 PROCEDURE — 6360000002 HC RX W HCPCS: Performed by: OBSTETRICS & GYNECOLOGY

## 2021-06-29 PROCEDURE — 86901 BLOOD TYPING SEROLOGIC RH(D): CPT

## 2021-06-29 PROCEDURE — 86900 BLOOD TYPING SEROLOGIC ABO: CPT

## 2021-06-29 PROCEDURE — 6370000000 HC RX 637 (ALT 250 FOR IP): Performed by: OBSTETRICS & GYNECOLOGY

## 2021-06-29 PROCEDURE — 3609079900 HC CESAREAN SECTION: Performed by: OBSTETRICS & GYNECOLOGY

## 2021-06-29 PROCEDURE — 6360000002 HC RX W HCPCS: Performed by: ANESTHESIOLOGY

## 2021-06-29 PROCEDURE — C1765 ADHESION BARRIER: HCPCS | Performed by: OBSTETRICS & GYNECOLOGY

## 2021-06-29 PROCEDURE — 3700000000 HC ANESTHESIA ATTENDED CARE: Performed by: OBSTETRICS & GYNECOLOGY

## 2021-06-29 PROCEDURE — 6360000002 HC RX W HCPCS

## 2021-06-29 PROCEDURE — 36415 COLL VENOUS BLD VENIPUNCTURE: CPT

## 2021-06-29 PROCEDURE — 2580000003 HC RX 258: Performed by: OBSTETRICS & GYNECOLOGY

## 2021-06-29 PROCEDURE — 1220000000 HC SEMI PRIVATE OB R&B

## 2021-06-29 PROCEDURE — 2500000003 HC RX 250 WO HCPCS: Performed by: NURSE ANESTHETIST, CERTIFIED REGISTERED

## 2021-06-29 PROCEDURE — 3700000001 HC ADD 15 MINUTES (ANESTHESIA): Performed by: OBSTETRICS & GYNECOLOGY

## 2021-06-29 PROCEDURE — 6360000002 HC RX W HCPCS: Performed by: NURSE ANESTHETIST, CERTIFIED REGISTERED

## 2021-06-29 PROCEDURE — 7100000000 HC PACU RECOVERY - FIRST 15 MIN: Performed by: OBSTETRICS & GYNECOLOGY

## 2021-06-29 PROCEDURE — 7100000001 HC PACU RECOVERY - ADDTL 15 MIN: Performed by: OBSTETRICS & GYNECOLOGY

## 2021-06-29 PROCEDURE — 2500000003 HC RX 250 WO HCPCS: Performed by: OBSTETRICS & GYNECOLOGY

## 2021-06-29 PROCEDURE — 2709999900 HC NON-CHARGEABLE SUPPLY: Performed by: OBSTETRICS & GYNECOLOGY

## 2021-06-29 PROCEDURE — 2500000003 HC RX 250 WO HCPCS

## 2021-06-29 PROCEDURE — 80307 DRUG TEST PRSMV CHEM ANLYZR: CPT

## 2021-06-29 DEVICE — BARRIER ADH W3XL4IN UTER PELV ABSRB GYNECARE INTCEED: Type: IMPLANTABLE DEVICE | Site: UTERUS | Status: FUNCTIONAL

## 2021-06-29 RX ORDER — METOCLOPRAMIDE HYDROCHLORIDE 5 MG/ML
10 INJECTION INTRAMUSCULAR; INTRAVENOUS ONCE
Status: COMPLETED | OUTPATIENT
Start: 2021-06-29 | End: 2021-06-29

## 2021-06-29 RX ORDER — MORPHINE SULFATE 2 MG/ML
2 INJECTION, SOLUTION INTRAMUSCULAR; INTRAVENOUS
Status: DISCONTINUED | OUTPATIENT
Start: 2021-06-30 | End: 2021-07-01 | Stop reason: HOSPADM

## 2021-06-29 RX ORDER — ONDANSETRON 2 MG/ML
4 INJECTION INTRAMUSCULAR; INTRAVENOUS EVERY 6 HOURS PRN
Status: DISPENSED | OUTPATIENT
Start: 2021-06-29 | End: 2021-06-30

## 2021-06-29 RX ORDER — SODIUM CHLORIDE 9 MG/ML
25 INJECTION, SOLUTION INTRAVENOUS PRN
Status: DISCONTINUED | OUTPATIENT
Start: 2021-06-29 | End: 2021-06-29

## 2021-06-29 RX ORDER — SODIUM CHLORIDE, SODIUM LACTATE, POTASSIUM CHLORIDE, CALCIUM CHLORIDE 600; 310; 30; 20 MG/100ML; MG/100ML; MG/100ML; MG/100ML
INJECTION, SOLUTION INTRAVENOUS CONTINUOUS
Status: DISCONTINUED | OUTPATIENT
Start: 2021-06-29 | End: 2021-07-01 | Stop reason: HOSPADM

## 2021-06-29 RX ORDER — OXYCODONE HYDROCHLORIDE 5 MG/1
5 TABLET ORAL EVERY 4 HOURS PRN
Status: ACTIVE | OUTPATIENT
Start: 2021-06-29 | End: 2021-06-30

## 2021-06-29 RX ORDER — CARBOPROST TROMETHAMINE 250 UG/ML
250 INJECTION, SOLUTION INTRAMUSCULAR PRN
Status: DISCONTINUED | OUTPATIENT
Start: 2021-06-29 | End: 2021-07-01 | Stop reason: HOSPADM

## 2021-06-29 RX ORDER — TRISODIUM CITRATE DIHYDRATE AND CITRIC ACID MONOHYDRATE 500; 334 MG/5ML; MG/5ML
15 SOLUTION ORAL ONCE
Status: COMPLETED | OUTPATIENT
Start: 2021-06-29 | End: 2021-06-29

## 2021-06-29 RX ORDER — OXYTOCIN 10 [USP'U]/ML
INJECTION, SOLUTION INTRAMUSCULAR; INTRAVENOUS PRN
Status: DISCONTINUED | OUTPATIENT
Start: 2021-06-29 | End: 2021-06-29 | Stop reason: SDUPTHER

## 2021-06-29 RX ORDER — PRENATAL WITH FERROUS FUM AND FOLIC ACID 3080; 920; 120; 400; 22; 1.84; 3; 20; 10; 1; 12; 200; 27; 25; 2 [IU]/1; [IU]/1; MG/1; [IU]/1; MG/1; MG/1; MG/1; MG/1; MG/1; MG/1; UG/1; MG/1; MG/1; MG/1; MG/1
1 TABLET ORAL DAILY
Status: DISCONTINUED | OUTPATIENT
Start: 2021-06-29 | End: 2021-07-01 | Stop reason: HOSPADM

## 2021-06-29 RX ORDER — OXYCODONE HYDROCHLORIDE 5 MG/1
5 TABLET ORAL EVERY 4 HOURS PRN
Status: DISCONTINUED | OUTPATIENT
Start: 2021-06-30 | End: 2021-07-01 | Stop reason: HOSPADM

## 2021-06-29 RX ORDER — SODIUM CHLORIDE 0.9 % (FLUSH) 0.9 %
10 SYRINGE (ML) INJECTION PRN
Status: DISCONTINUED | OUTPATIENT
Start: 2021-06-29 | End: 2021-07-01 | Stop reason: HOSPADM

## 2021-06-29 RX ORDER — METHYLERGONOVINE MALEATE 0.2 MG/ML
200 INJECTION INTRAVENOUS PRN
Status: DISCONTINUED | OUTPATIENT
Start: 2021-06-29 | End: 2021-07-01 | Stop reason: HOSPADM

## 2021-06-29 RX ORDER — ACETAMINOPHEN 500 MG
1000 TABLET ORAL EVERY 8 HOURS SCHEDULED
Status: DISCONTINUED | OUTPATIENT
Start: 2021-06-29 | End: 2021-06-29 | Stop reason: SDUPTHER

## 2021-06-29 RX ORDER — KETOROLAC TROMETHAMINE 30 MG/ML
15 INJECTION, SOLUTION INTRAMUSCULAR; INTRAVENOUS EVERY 6 HOURS
Status: COMPLETED | OUTPATIENT
Start: 2021-06-29 | End: 2021-06-30

## 2021-06-29 RX ORDER — MODIFIED LANOLIN
OINTMENT (GRAM) TOPICAL
Status: DISCONTINUED | OUTPATIENT
Start: 2021-06-29 | End: 2021-07-01 | Stop reason: HOSPADM

## 2021-06-29 RX ORDER — MISOPROSTOL 200 UG/1
800 TABLET ORAL PRN
Status: DISCONTINUED | OUTPATIENT
Start: 2021-06-29 | End: 2021-07-01 | Stop reason: HOSPADM

## 2021-06-29 RX ORDER — DOCUSATE SODIUM 100 MG/1
100 CAPSULE, LIQUID FILLED ORAL 2 TIMES DAILY
Status: DISCONTINUED | OUTPATIENT
Start: 2021-06-29 | End: 2021-07-01 | Stop reason: HOSPADM

## 2021-06-29 RX ORDER — NALOXONE HYDROCHLORIDE 0.4 MG/ML
0.4 INJECTION, SOLUTION INTRAMUSCULAR; INTRAVENOUS; SUBCUTANEOUS PRN
Status: ACTIVE | OUTPATIENT
Start: 2021-06-29 | End: 2021-06-30

## 2021-06-29 RX ORDER — FENTANYL CITRATE 50 UG/ML
INJECTION, SOLUTION INTRAMUSCULAR; INTRAVENOUS PRN
Status: DISCONTINUED | OUTPATIENT
Start: 2021-06-29 | End: 2021-06-29 | Stop reason: SDUPTHER

## 2021-06-29 RX ORDER — SIMETHICONE 80 MG
80 TABLET,CHEWABLE ORAL EVERY 6 HOURS PRN
Status: DISCONTINUED | OUTPATIENT
Start: 2021-06-29 | End: 2021-07-01 | Stop reason: HOSPADM

## 2021-06-29 RX ORDER — ACETAMINOPHEN 325 MG/1
975 TABLET ORAL ONCE
Status: COMPLETED | OUTPATIENT
Start: 2021-06-29 | End: 2021-06-29

## 2021-06-29 RX ORDER — SODIUM CHLORIDE, SODIUM LACTATE, POTASSIUM CHLORIDE, CALCIUM CHLORIDE 600; 310; 30; 20 MG/100ML; MG/100ML; MG/100ML; MG/100ML
INJECTION, SOLUTION INTRAVENOUS CONTINUOUS
Status: DISCONTINUED | OUTPATIENT
Start: 2021-06-29 | End: 2021-06-29

## 2021-06-29 RX ORDER — DIPHENHYDRAMINE HYDROCHLORIDE 50 MG/ML
25 INJECTION INTRAMUSCULAR; INTRAVENOUS EVERY 6 HOURS PRN
Status: DISCONTINUED | OUTPATIENT
Start: 2021-06-30 | End: 2021-07-01 | Stop reason: HOSPADM

## 2021-06-29 RX ORDER — PHENYLEPHRINE HCL IN 0.9% NACL 1 MG/10 ML
SYRINGE (ML) INTRAVENOUS PRN
Status: DISCONTINUED | OUTPATIENT
Start: 2021-06-29 | End: 2021-06-29 | Stop reason: SDUPTHER

## 2021-06-29 RX ORDER — ONDANSETRON 2 MG/ML
INJECTION INTRAMUSCULAR; INTRAVENOUS PRN
Status: DISCONTINUED | OUTPATIENT
Start: 2021-06-29 | End: 2021-06-29 | Stop reason: SDUPTHER

## 2021-06-29 RX ORDER — ACETAMINOPHEN 500 MG
1000 TABLET ORAL EVERY 8 HOURS SCHEDULED
Status: DISCONTINUED | OUTPATIENT
Start: 2021-06-30 | End: 2021-07-01 | Stop reason: HOSPADM

## 2021-06-29 RX ORDER — KETOROLAC TROMETHAMINE 30 MG/ML
INJECTION, SOLUTION INTRAMUSCULAR; INTRAVENOUS PRN
Status: DISCONTINUED | OUTPATIENT
Start: 2021-06-29 | End: 2021-06-29 | Stop reason: SDUPTHER

## 2021-06-29 RX ORDER — ACETAMINOPHEN 325 MG/1
650 TABLET ORAL EVERY 4 HOURS PRN
Status: ACTIVE | OUTPATIENT
Start: 2021-06-29 | End: 2021-06-30

## 2021-06-29 RX ORDER — ONDANSETRON 4 MG/1
8 TABLET, ORALLY DISINTEGRATING ORAL EVERY 8 HOURS PRN
Status: DISCONTINUED | OUTPATIENT
Start: 2021-06-30 | End: 2021-07-01 | Stop reason: HOSPADM

## 2021-06-29 RX ORDER — MORPHINE SULFATE 4 MG/ML
4 INJECTION, SOLUTION INTRAMUSCULAR; INTRAVENOUS
Status: DISCONTINUED | OUTPATIENT
Start: 2021-06-30 | End: 2021-07-01 | Stop reason: HOSPADM

## 2021-06-29 RX ORDER — SODIUM CHLORIDE 9 MG/ML
25 INJECTION, SOLUTION INTRAVENOUS PRN
Status: DISCONTINUED | OUTPATIENT
Start: 2021-06-29 | End: 2021-07-01 | Stop reason: HOSPADM

## 2021-06-29 RX ORDER — BISACODYL 10 MG
10 SUPPOSITORY, RECTAL RECTAL DAILY PRN
Status: DISCONTINUED | OUTPATIENT
Start: 2021-06-29 | End: 2021-07-01 | Stop reason: HOSPADM

## 2021-06-29 RX ORDER — SODIUM CHLORIDE 0.9 % (FLUSH) 0.9 %
10 SYRINGE (ML) INJECTION PRN
Status: DISCONTINUED | OUTPATIENT
Start: 2021-06-29 | End: 2021-06-29

## 2021-06-29 RX ORDER — ONDANSETRON 2 MG/ML
4 INJECTION INTRAMUSCULAR; INTRAVENOUS EVERY 6 HOURS PRN
Status: DISCONTINUED | OUTPATIENT
Start: 2021-06-29 | End: 2021-06-29 | Stop reason: HOSPADM

## 2021-06-29 RX ORDER — SODIUM CHLORIDE, SODIUM LACTATE, POTASSIUM CHLORIDE, CALCIUM CHLORIDE 600; 310; 30; 20 MG/100ML; MG/100ML; MG/100ML; MG/100ML
INJECTION, SOLUTION INTRAVENOUS CONTINUOUS PRN
Status: DISCONTINUED | OUTPATIENT
Start: 2021-06-29 | End: 2021-06-29 | Stop reason: SDUPTHER

## 2021-06-29 RX ORDER — SODIUM CHLORIDE 0.9 % (FLUSH) 0.9 %
10 SYRINGE (ML) INJECTION EVERY 12 HOURS SCHEDULED
Status: DISCONTINUED | OUTPATIENT
Start: 2021-06-29 | End: 2021-07-01 | Stop reason: HOSPADM

## 2021-06-29 RX ORDER — SODIUM CHLORIDE, SODIUM LACTATE, POTASSIUM CHLORIDE, AND CALCIUM CHLORIDE .6; .31; .03; .02 G/100ML; G/100ML; G/100ML; G/100ML
1000 INJECTION, SOLUTION INTRAVENOUS ONCE
Status: COMPLETED | OUTPATIENT
Start: 2021-06-29 | End: 2021-06-29

## 2021-06-29 RX ORDER — MORPHINE SULFATE 0.5 MG/ML
INJECTION, SOLUTION EPIDURAL; INTRATHECAL; INTRAVENOUS PRN
Status: DISCONTINUED | OUTPATIENT
Start: 2021-06-29 | End: 2021-06-29 | Stop reason: SDUPTHER

## 2021-06-29 RX ORDER — SODIUM CHLORIDE 0.9 % (FLUSH) 0.9 %
10 SYRINGE (ML) INJECTION EVERY 12 HOURS SCHEDULED
Status: DISCONTINUED | OUTPATIENT
Start: 2021-06-29 | End: 2021-06-29

## 2021-06-29 RX ORDER — IBUPROFEN 800 MG/1
800 TABLET ORAL EVERY 8 HOURS
Status: DISCONTINUED | OUTPATIENT
Start: 2021-07-01 | End: 2021-06-30

## 2021-06-29 RX ORDER — KETOROLAC TROMETHAMINE 30 MG/ML
30 INJECTION, SOLUTION INTRAMUSCULAR; INTRAVENOUS EVERY 6 HOURS
Status: DISCONTINUED | OUTPATIENT
Start: 2021-06-30 | End: 2021-06-30

## 2021-06-29 RX ORDER — OXYCODONE HYDROCHLORIDE 5 MG/1
10 TABLET ORAL EVERY 4 HOURS PRN
Status: DISCONTINUED | OUTPATIENT
Start: 2021-06-30 | End: 2021-07-01 | Stop reason: HOSPADM

## 2021-06-29 RX ORDER — FERROUS SULFATE 325(65) MG
325 TABLET ORAL
Status: DISCONTINUED | OUTPATIENT
Start: 2021-06-30 | End: 2021-07-01 | Stop reason: HOSPADM

## 2021-06-29 RX ORDER — OXYCODONE HYDROCHLORIDE 5 MG/1
10 TABLET ORAL EVERY 4 HOURS PRN
Status: ACTIVE | OUTPATIENT
Start: 2021-06-29 | End: 2021-06-30

## 2021-06-29 RX ORDER — DIPHENHYDRAMINE HYDROCHLORIDE 50 MG/ML
25 INJECTION INTRAMUSCULAR; INTRAVENOUS EVERY 6 HOURS PRN
Status: ACTIVE | OUTPATIENT
Start: 2021-06-29 | End: 2021-06-30

## 2021-06-29 RX ORDER — ASPIRIN 81 MG/1
81 TABLET ORAL DAILY
Status: DISCONTINUED | OUTPATIENT
Start: 2021-06-29 | End: 2021-07-01 | Stop reason: HOSPADM

## 2021-06-29 RX ORDER — ONDANSETRON 2 MG/ML
4 INJECTION INTRAMUSCULAR; INTRAVENOUS EVERY 6 HOURS PRN
Status: DISCONTINUED | OUTPATIENT
Start: 2021-06-30 | End: 2021-07-01 | Stop reason: HOSPADM

## 2021-06-29 RX ADMIN — FAMOTIDINE 20 MG: 10 INJECTION INTRAVENOUS at 06:40

## 2021-06-29 RX ADMIN — OXYTOCIN 20 UNITS: 10 INJECTION, SOLUTION INTRAMUSCULAR; INTRAVENOUS at 08:08

## 2021-06-29 RX ADMIN — ONDANSETRON HYDROCHLORIDE 4 MG: 4 INJECTION, SOLUTION INTRAMUSCULAR; INTRAVENOUS at 07:35

## 2021-06-29 RX ADMIN — SODIUM CHLORIDE, POTASSIUM CHLORIDE, SODIUM LACTATE AND CALCIUM CHLORIDE: 600; 310; 30; 20 INJECTION, SOLUTION INTRAVENOUS at 16:42

## 2021-06-29 RX ADMIN — SODIUM CHLORIDE, POTASSIUM CHLORIDE, SODIUM LACTATE AND CALCIUM CHLORIDE: 600; 310; 30; 20 INJECTION, SOLUTION INTRAVENOUS at 07:33

## 2021-06-29 RX ADMIN — Medication 50 MCG: at 07:46

## 2021-06-29 RX ADMIN — MORPHINE SULFATE 0.2 MG: 0.5 INJECTION, SOLUTION EPIDURAL; INTRATHECAL; INTRAVENOUS at 07:42

## 2021-06-29 RX ADMIN — Medication 50 MCG: at 08:08

## 2021-06-29 RX ADMIN — Medication 87.3 MILLI-UNITS/MIN: at 08:51

## 2021-06-29 RX ADMIN — ACETAMINOPHEN 975 MG: 325 TABLET ORAL at 06:57

## 2021-06-29 RX ADMIN — FENTANYL CITRATE 20 MCG: 50 INJECTION, SOLUTION INTRAMUSCULAR; INTRAVENOUS at 07:42

## 2021-06-29 RX ADMIN — KETOROLAC TROMETHAMINE 15 MG: 30 INJECTION, SOLUTION INTRAMUSCULAR; INTRAVENOUS at 20:42

## 2021-06-29 RX ADMIN — SODIUM CHLORIDE, POTASSIUM CHLORIDE, SODIUM LACTATE AND CALCIUM CHLORIDE: 600; 310; 30; 20 INJECTION, SOLUTION INTRAVENOUS at 08:08

## 2021-06-29 RX ADMIN — Medication 50 MCG: at 07:43

## 2021-06-29 RX ADMIN — KETOROLAC TROMETHAMINE 30 MG: 30 INJECTION, SOLUTION INTRAMUSCULAR at 08:38

## 2021-06-29 RX ADMIN — METOCLOPRAMIDE 10 MG: 5 INJECTION, SOLUTION INTRAMUSCULAR; INTRAVENOUS at 06:40

## 2021-06-29 RX ADMIN — ONDANSETRON 4 MG: 2 INJECTION INTRAMUSCULAR; INTRAVENOUS at 17:39

## 2021-06-29 RX ADMIN — KETOROLAC TROMETHAMINE 15 MG: 30 INJECTION, SOLUTION INTRAMUSCULAR; INTRAVENOUS at 14:10

## 2021-06-29 RX ADMIN — ASPIRIN 81 MG: 81 TABLET, COATED ORAL at 15:06

## 2021-06-29 RX ADMIN — SODIUM CHLORIDE, POTASSIUM CHLORIDE, SODIUM LACTATE AND CALCIUM CHLORIDE: 600; 310; 30; 20 INJECTION, SOLUTION INTRAVENOUS at 06:39

## 2021-06-29 RX ADMIN — KETOROLAC TROMETHAMINE 30 MG: 30 INJECTION, SOLUTION INTRAMUSCULAR at 08:21

## 2021-06-29 RX ADMIN — SODIUM CHLORIDE, POTASSIUM CHLORIDE, SODIUM LACTATE AND CALCIUM CHLORIDE: 600; 310; 30; 20 INJECTION, SOLUTION INTRAVENOUS at 08:51

## 2021-06-29 RX ADMIN — SODIUM CHLORIDE, POTASSIUM CHLORIDE, SODIUM LACTATE AND CALCIUM CHLORIDE 1000 ML: 600; 310; 30; 20 INJECTION, SOLUTION INTRAVENOUS at 05:40

## 2021-06-29 RX ADMIN — Medication 50 MCG: at 07:49

## 2021-06-29 RX ADMIN — CEFAZOLIN 2000 MG: 10 INJECTION, POWDER, FOR SOLUTION INTRAVENOUS at 07:28

## 2021-06-29 RX ADMIN — Medication 50 MCG: at 07:52

## 2021-06-29 RX ADMIN — SODIUM CITRATE AND CITRIC ACID MONOHYDRATE 15 ML: 500; 334 SOLUTION ORAL at 06:58

## 2021-06-29 ASSESSMENT — PULMONARY FUNCTION TESTS
PIF_VALUE: 1
PIF_VALUE: 0
PIF_VALUE: 1
PIF_VALUE: 0

## 2021-06-29 ASSESSMENT — PAIN SCALES - GENERAL
PAINLEVEL_OUTOF10: 0

## 2021-06-29 NOTE — FLOWSHEET NOTE
Admitted to  16 in bed from L&D with baby in arms, oriented to room and unit, voices understanding. Denies pain, nausea or itching at present.

## 2021-06-29 NOTE — ANESTHESIA PROCEDURE NOTES
Spinal Block    Patient location during procedure: OB  Reason for block: primary anesthetic  Staffing  Performed: resident/CRNA   Anesthesiologist: Stephanie Israel MD  Resident/CRNA: ERIKA Sarmiento CRNA  Preanesthetic Checklist  Completed: patient identified, IV checked, site marked, risks and benefits discussed, surgical consent, monitors and equipment checked, pre-op evaluation, timeout performed, anesthesia consent given, oxygen available and patient being monitored  Spinal Block  Patient position: sitting  Prep: ChloraPrep  Patient monitoring: continuous pulse ox and frequent blood pressure checks  Approach: midline  Location: L3/L4  Provider prep: sterile gloves and mask  Local infiltration: lidocaine  Agent: bupivacaine  Adjuvant: duramorph (duramorph 0.2mg and fentanyl 20mcg)  Dose: 1.6  Dose: 1.6  Needle  Needle type: Pencan   Needle gauge: 25 G  Needle length: 3.5 in  Assessment  Sensory level: T4  Swirl obtained: Yes  CSF: clear  Attempts: 1  Hemodynamics: stable

## 2021-06-29 NOTE — INTERVAL H&P NOTE
[address] [address]  History and Physical Update    Pt Name: Manny Bruce  MRN: [id number]  YOB: 1980  Date of evaluation: 6/29/2021    [x] I have examined the patient and reviewed the H&P/Consult and there are no changes to the patient or plans.     [] I have examined the patient and reviewed the H&P/Consult and have noted the following changes:        Anurag Archer DO,  Electronically signed 6/29/2021 at 7:31 AM

## 2021-06-29 NOTE — FLOWSHEET NOTE
Paged Dr. Kenji Oshea that repeat  section is here, prepped and ready. FHT's reactive. Last baby aspirin taken on .

## 2021-06-29 NOTE — L&D DELIVERY NOTE
Department of Obstetrics and Gynecology   Section Note    Pt Name: Den Mcneal  MRN: [id number] Hunter Callejas #: [de-identified]  YOB: 1980  Procedure Performed By: Anjum Johnson DO, DO    Indications: previous uterine incision parry x2    Pre-operative Diagnosis: 39 week pregnancy. Post-operative Diagnosis:  Same, Delivered, Living newbornMale  Procedure:  repeat low transverse  section  Findings:  Normal tubes, ovaries and uterus. Baby Male, Apgars  8/9 and weight of 4120gm    Quantitative Blood Loss:  390 cc         Specimens: None     Complications:  None         Condition: infant stable to general nursery and mother stable    Indications:     Den Mcneal is a 45 y.o. female [id number] at 36w3d who presented for  section for  previous uterine incision parry x2. She understood the risks and benefits and signed informed consent. Procedure: The patient was taken to the Operating Room where spinal anesthesia was placed. She was placed in the dorsal supine position with a leftward tilt and prepped and draped. A Pfannenstiel incision was made with the scalpel taken down to the fascia. The fascia was nicked in the midline. This incision extended laterally with curved tabares scissors. The underlying rectus muscle dissected bluntly and with the Tabares scissors. The peritoneum identified and entered bluntly. This incision extended superiorly and inferior with good visualization of the bladder. The vesicouterine peritoneum was identified and entered sharply. Vesicouterine adhesions noted and taken down carefully with Metzenbaum scissors. This incision extended laterally and the bladder flap created digitally. The uterus was incised in a low transverse fashion and extended digitally. The infant was delivered head first with vacuum assistance. Nuchal cord x 2 noted and reduced. The rest of the baby delivered. The nose and mouth were suctioned.   The cord was clamped and cut and the baby was stimulated. Cord blood was obtained, the placenta manually extracted and delivered intact with a 3 vessel cord. The uterus was  cleared of all clots and debris and repaired with #0 vicryl in a running locked fashion. A second imbricating layer of 0 vicryl was used for uterine strength. An additional running suture of 0-vicryl used on the raw edges from the uterine adhesions. Hemostasis was assured with Bovie cautery. Intercede was placed over the uterine incision and additional area that had adhesions to help prevent future adhesions from forming. The peritoneum was closed with a 2-0 vicryl, the fascia was inspected and found to be hemostatic and closed with 0 vicryl in a continuous fashion. The subcutaneous tissue was made hemostatic with Bovie cautery and reapproximated using 2-0 plain. The skin was closed with 4 0 vicryl suture (s). A Mepilex dressing was placed over the incision. Sponge, lap and needle counts were correct x 2. The patient tolerated the procedure well. The patient received antibiotics prior to skin incision. Mother's Information    Labor Events     labor?: No  Rupture type:  Intact     Mother Delivery Information    Episiotomy: None  Lacerations: None  Surgical or Additional Est. Blood Loss (mL): 0 (View Only): Edit in Flowsheets   Combined Est. Blood Loss (mL): 0        Elizabeth Ceja [[id number]]    Labor Events     labor?: No   steroids?: None  Cervical ripening date/time:     Antibiotics received during labor?: Yes  Rupture date/time: 21 08:03:00   Rupture type: Artificial=AROM  Fluid color: Clear  Fluid odor: None  Induction: None  Augmentation: None  Labor complications: None          Anesthesia    Method: Spinal     Assisted Delivery Details    Forceps attempted?: No  Vacuum extractor attempted?: Yes  Vacuum type: Silastic cup      Document Additional Attempt       Document Additional Attempt          Vacuum applied by:  Delivery Information    Episiotomy: None  Perineal lacerations: None    Vaginal laceration: No    Cervical laceration: No    Surgical or additional est. blood loss (mL): 0 (View Only): Edit in Flowsheets   Combined est. blood loss (mL): 0

## 2021-06-29 NOTE — LACTATION NOTE
Pt states infant has been latching well and she is also providing formula supplementation after latching. Breastfeeding booklet provided. Pt states that she exclusively pumped and bottle fed other children. Pt requests a hospital pump set up. Pump teaching provided. Pt states no other questions or concerns at this time. Encouraged Pt to call out with questions or for assistance as needed. Will follow up PRN.

## 2021-06-29 NOTE — FLOWSHEET NOTE
Pt of Dr. Jazmyne Card at 39+1 weeks. Pt is here for a scheduled repeat  section with placenta donation. Pt denies leaking of fluid and vaginal bleedings. States she has had some contractions, but far apart. Positive fetal movement. Patient to BR to void, informed of maternal drug testing policy in place on all laboring patients. Consent signed and urine sent.

## 2021-06-29 NOTE — ANESTHESIA PRE PROCEDURE
New Bag at 21 0733    morphine (PF) [facility]) injection   Intraspinal PRN Marrianne Bunkers, APRN - CRNA   0.2 mg at 21 4682    fentaNYL (SUBLIMAZE) injection   Intraspinal PRN Marrianne Bunkers, APRN - CRNA   20 mcg at 21 0742    ondansetron (ZOFRAN) injection   Intravenous PRN Marrianne Bunkers, APRN - CRNA   4 mg at 21 0735    phenylephrine (ERAN-SYNEPHRINE) 1 MG/10ML prefilled syringe   Intravenous PRN Marrianne Bunkers, APRN - CRNA   50 mcg at 21 6654       Allergies:  No Known Allergies    Problem List:    Patient Active Problem List   Diagnosis Code    SHAINA (obstructive sleep apnea) G47.33    Obesity (BMI 30-39. 9) E66.9    Pregnancy complication before birth O80.65       Past Medical History:        Diagnosis Date    Diabetes mellitus ([facility] [address]     gestational diabetes    Fibroid uterus 2018    Infertility, female        Past Surgical History:        Procedure Laterality Date    ADRENALECTOMY  2012    Removal of cyst on Adrenal Gland     SECTION  2011     SECTION  2011     SECTION  2014    KNEE SURGERY Right 1998    TYMPANOSTOMY TUBE PLACEMENT  age 4   [facility] [address] EXTRACTION         Social History:    Social History     Tobacco Use    Smoking status: Never Smoker    Smokeless tobacco: Never Used   Substance Use Topics    Alcohol use: Not Currently     Comment: socially                                Counseling given: Not Answered      Vital Signs (Current):   Vitals:    21 0530 21 0627 21 0700 21 0712   BP:  119/67 118/71 120/75   Pulse:  78 80 85   Resp:  18 18 18   Temp:    96.6 °F (35.9 °C)   SpO2:       Weight: 244 lb (110.7 kg)      Height: 5' 8\" (1.727 m)                                                 BP Readings from Last 3 Encounters:   21 120/75   21 125/67   21 120/70       NPO Status: Time of last liquid consumption: 0 Time of last solid consumption: 2130                        Date of last liquid consumption: 06/29/21                        Date of last solid food consumption: 06/28/21    BMI:   Wt Readings from Last 3 Encounters:   06/29/21 244 lb (110.7 kg)   04/13/21 250 lb (113.4 kg)   04/20/20 240 lb (108.9 kg)     Body mass index is 37.1 kg/m². CBC:   Lab Results   Component Value Date    WBC 6.2 06/29/2021    RBC 3.56 06/29/2021    RBC 3.49 11/30/2011    HGB 10.8 06/29/2021    HCT 32.4 06/29/2021    MCV 91.0 06/29/2021    RDW 13.3 03/03/2014     06/29/2021       CMP:   Lab Results   Component Value Date     04/13/2021    K 4.1 04/13/2021     04/13/2021    CO2 25 04/13/2021    BUN 9 04/13/2021    CREATININE 0.6 04/13/2021    LABGLOM >90 04/13/2021    GLUCOSE 85 04/13/2021    PROT 7.7 08/13/2012    CALCIUM 9.2 04/13/2021    BILITOT 0.7 08/13/2012    ALKPHOS 61 08/13/2012    AST 16 08/13/2012    ALT 17 08/13/2012       POC Tests: No results for input(s): POCGLU, POCNA, POCK, POCCL, POCBUN, POCHEMO, POCHCT in the last 72 hours. Coags:   Lab Results   Component Value Date    INR 0.91 02/27/2014    APTT 28.4 02/27/2014       HCG (If Applicable): No results found for: PREGTESTUR, PREGSERUM, HCG, HCGQUANT     ABGs: No results found for: PHART, PO2ART, RHN3CIY, NGV8MYG, BEART, H6NLBVRU     Type & Screen (If Applicable):  Lab Results   Component Value Date    LABRH POS 06/29/2021       Drug/Infectious Status (If Applicable):  No results found for: HIV, HEPCAB    COVID-19 Screening (If Applicable):   Lab Results   Component Value Date    COVID19 Not Detected 07/27/2020           Anesthesia Evaluation   no history of anesthetic complications:   Airway: Mallampati: II  TM distance: >3 FB   Neck ROM: full  Mouth opening: > = 3 FB Dental:          Pulmonary:normal exam    (+) sleep apnea:            Patient did not smoke on day of surgery.                  Cardiovascular:  Exercise tolerance: good (>4 METS), Neuro/Psych:   Negative Neuro/Psych ROS              GI/Hepatic/Renal:             Endo/Other:    (+) Diabetes, . Pt had no PAT visit       Abdominal:             Vascular: negative vascular ROS. Other Findings:             Anesthesia Plan      spinal     ASA 2           MIPS: Postoperative opioids intended and Prophylactic antiemetics administered. Anesthetic plan and risks discussed with patient. Plan discussed with CRNA.                   To Almeida MD   6/29/2021

## 2021-06-30 LAB
BASOPHILS # BLD: 0.5 %
BASOPHILS ABSOLUTE: 0 THOU/MM3 (ref 0–0.1)
EOSINOPHIL # BLD: 1.7 %
EOSINOPHILS ABSOLUTE: 0.1 THOU/MM3 (ref 0–0.4)
ERYTHROCYTE [DISTWIDTH] IN BLOOD BY AUTOMATED COUNT: 13.8 % (ref 11.5–14.5)
ERYTHROCYTE [DISTWIDTH] IN BLOOD BY AUTOMATED COUNT: 47.1 FL (ref 35–45)
HCT VFR BLD CALC: 30.2 % (ref 37–47)
HEMOGLOBIN: 10 GM/DL (ref 12–16)
IMMATURE GRANS (ABS): 0.08 THOU/MM3 (ref 0–0.07)
IMMATURE GRANULOCYTES: 1 %
LYMPHOCYTES # BLD: 14.2 %
LYMPHOCYTES ABSOLUTE: 1.2 THOU/MM3 (ref 1–4.8)
MCH RBC QN AUTO: 30.7 PG (ref 26–33)
MCHC RBC AUTO-ENTMCNC: 33.1 GM/DL (ref 32.2–35.5)
MCV RBC AUTO: 92.6 FL (ref 81–99)
MONOCYTES # BLD: 7.2 %
MONOCYTES ABSOLUTE: 0.6 THOU/MM3 (ref 0.4–1.3)
NUCLEATED RED BLOOD CELLS: 0 /100 WBC
PLATELET # BLD: 187 THOU/MM3 (ref 130–400)
PMV BLD AUTO: 10.2 FL (ref 9.4–12.4)
RBC # BLD: 3.26 MILL/MM3 (ref 4.2–5.4)
SEG NEUTROPHILS: 75.4 %
SEGMENTED NEUTROPHILS ABSOLUTE COUNT: 6.3 THOU/MM3 (ref 1.8–7.7)
WBC # BLD: 8.3 THOU/MM3 (ref 4.8–10.8)

## 2021-06-30 PROCEDURE — 6360000002 HC RX W HCPCS: Performed by: OBSTETRICS & GYNECOLOGY

## 2021-06-30 PROCEDURE — 1220000000 HC SEMI PRIVATE OB R&B

## 2021-06-30 PROCEDURE — 85025 COMPLETE CBC W/AUTO DIFF WBC: CPT

## 2021-06-30 PROCEDURE — 2580000003 HC RX 258: Performed by: OBSTETRICS & GYNECOLOGY

## 2021-06-30 PROCEDURE — 6370000000 HC RX 637 (ALT 250 FOR IP): Performed by: OBSTETRICS & GYNECOLOGY

## 2021-06-30 PROCEDURE — 36415 COLL VENOUS BLD VENIPUNCTURE: CPT

## 2021-06-30 PROCEDURE — 6360000002 HC RX W HCPCS: Performed by: ANESTHESIOLOGY

## 2021-06-30 RX ORDER — IBUPROFEN 800 MG/1
800 TABLET ORAL EVERY 8 HOURS
Status: DISCONTINUED | OUTPATIENT
Start: 2021-06-30 | End: 2021-07-01 | Stop reason: HOSPADM

## 2021-06-30 RX ORDER — LORAZEPAM 0.5 MG/1
0.5 TABLET ORAL ONCE
Status: COMPLETED | OUTPATIENT
Start: 2021-06-30 | End: 2021-06-30

## 2021-06-30 RX ORDER — OXYCODONE HYDROCHLORIDE 5 MG/1
5 TABLET ORAL EVERY 6 HOURS PRN
Qty: 16 TABLET | Refills: 0 | Status: SHIPPED | OUTPATIENT
Start: 2021-06-30 | End: 2021-07-04

## 2021-06-30 RX ADMIN — ACETAMINOPHEN 1000 MG: 500 TABLET ORAL at 08:13

## 2021-06-30 RX ADMIN — SODIUM CHLORIDE, POTASSIUM CHLORIDE, SODIUM LACTATE AND CALCIUM CHLORIDE: 600; 310; 30; 20 INJECTION, SOLUTION INTRAVENOUS at 00:33

## 2021-06-30 RX ADMIN — KETOROLAC TROMETHAMINE 30 MG: 30 INJECTION, SOLUTION INTRAMUSCULAR; INTRAVENOUS at 10:11

## 2021-06-30 RX ADMIN — PRENATAL WITH FERROUS FUM AND FOLIC ACID 1 TABLET: 3080; 920; 120; 400; 22; 1.84; 3; 20; 10; 1; 12; 200; 27; 25; 2 TABLET ORAL at 08:12

## 2021-06-30 RX ADMIN — DOCUSATE SODIUM 100 MG: 100 CAPSULE, LIQUID FILLED ORAL at 08:13

## 2021-06-30 RX ADMIN — ACETAMINOPHEN 1000 MG: 500 TABLET ORAL at 16:47

## 2021-06-30 RX ADMIN — ASPIRIN 81 MG: 81 TABLET, COATED ORAL at 08:13

## 2021-06-30 RX ADMIN — LORAZEPAM 0.5 MG: 0.5 TABLET ORAL at 22:30

## 2021-06-30 RX ADMIN — SIMETHICONE 80 MG: 80 TABLET, CHEWABLE ORAL at 21:54

## 2021-06-30 RX ADMIN — KETOROLAC TROMETHAMINE 15 MG: 30 INJECTION, SOLUTION INTRAMUSCULAR; INTRAVENOUS at 03:17

## 2021-06-30 RX ADMIN — DOCUSATE SODIUM 100 MG: 100 CAPSULE, LIQUID FILLED ORAL at 19:59

## 2021-06-30 RX ADMIN — IBUPROFEN 800 MG: 800 TABLET, FILM COATED ORAL at 17:19

## 2021-06-30 ASSESSMENT — PAIN SCALES - GENERAL
PAINLEVEL_OUTOF10: 1
PAINLEVEL_OUTOF10: 3
PAINLEVEL_OUTOF10: 1
PAINLEVEL_OUTOF10: 2
PAINLEVEL_OUTOF10: 3

## 2021-06-30 NOTE — PLAN OF CARE
Problem: Discharge Planning:  Goal: Discharged to appropriate level of care  Description: Discharged to appropriate level of care  6/30/2021 0145 by Milly Luna RN  Outcome: Ongoing  Note: Gordon Mayfield in a row      Problem: Fluid Volume - Imbalance:  Goal: Absence of postpartum hemorrhage signs and symptoms  Description: Absence of postpartum hemorrhage signs and symptoms  6/30/2021 0145 by Milly Luna RN  Outcome: Ongoing  Note: Lochia WNL      Problem: Infection - Surgical Site:  Goal: Will show no infection signs and symptoms  Description: Will show no infection signs and symptoms  6/30/2021 0145 by Milly Luna RN  Outcome: Ongoing  Note: No signs of infection      Problem: Mood - Altered:  Goal: Mood stable  Description: Mood stable  6/30/2021 0145 by Milly Luna RN  Outcome: Ongoing  Note: Pt pleasant      Problem: Nausea/Vomiting:  Goal: Absence of nausea/vomiting  Description: Absence of nausea/vomiting  6/30/2021 0145 by Milly Luna RN  Outcome: Ongoing  Note: Pt had large emesis at beginning of shift. No nausea since. Problem: Pain - Acute:  Goal: Pain level will decrease  Description: Pain level will decrease  6/30/2021 0145 by Milly Luna RN  Outcome: Ongoing  Note: Pain controlled with po meds. Discussed ice for perineal pain and/or incisional pain or the use of warm blanket/heating pad for uterine cramps. Pt states her pain goal 4/10 has been met.       Problem: Urinary Retention:  Goal: Urinary elimination within specified parameters  Description: Urinary elimination within specified parameters  6/30/2021 0145 by Milly Luna RN  Outcome: Ongoing  Note: [place] draining >30 ml hour     Problem: Venous Thromboembolism:  Goal: Will show no signs or symptoms of venous thromboembolism  Description: Will show no signs or symptoms of venous thromboembolism  6/30/2021 0145 by Milly Luna RN  Outcome: Ongoing  Note: Negative homans    Plan of care discussed with mother and she contributes to goal setting and voices understanding of plan of care.

## 2021-06-30 NOTE — ANESTHESIA POSTPROCEDURE EVALUATION
Department of Anesthesiology  Postprocedure Note    Patient: Rohini Goodwin  MRN: [id number]  YOB: 1980  Date of evaluation: 2021  Time:  1:56 PM     Procedure Summary     Date: 21 [facility] / [facility]  [facility]    Anesthesia Start: 8972 Anesthesia Stop: Ferny Hernandez    Procedure: REPEAT  SECTION (N/A Uterus) Diagnosis: (ENCOUNTER FOR )    Surgeons: Amara Jon DO Responsible Provider: Louie Montanez MD    Anesthesia Type: spinal ASA Status: 2          Anesthesia Type: spinal    Rober Phase I: Rober Score: 9    Rboer Phase II: Rober Score: 10    Last vitals: Reviewed and per EMR flowsheets.        Anesthesia Post Evaluation    Patient location during evaluation: floor  Patient participation: complete - patient participated  Level of consciousness: awake  Airway patency: patent  Nausea & Vomiting: no vomiting and no nausea  Complications: no  Cardiovascular status: hemodynamically stable  Respiratory status: acceptable  Hydration status: stable

## 2021-06-30 NOTE — LACTATION NOTE
Provided pt. With nipple cream.  Discussed hospital grade pump rental locations with pt. Encouraged pt. To call out to lactation for any assistance. Will continue to follow up with pt. PRN.

## 2021-06-30 NOTE — DISCHARGE SUMMARY
Obstetric Discharge Summary      Pt Name: Sheng Dallas  MRN: [id number] Marck #: [[id number]]  YOB: 1980        Admitting Diagnosis  IUP  OB History        5    Para   4    Term   3       1    AB   1    Living   3       SAB   1    TAB        Ectopic        Molar        Multiple   0    Live Births   3                Reasons for Admission on 2021  5:01 AM   delivery delivered [O82]        Postpartum/Operative Complications       [place] Data  Information for the patient's :  Donice Hodgkin [[id number]]   male   Birth Weight: 9 lb 1.3 oz (4.12 kg)       Discharge Diagnosis  Postpartum, CSXN Delivery    Discharge Information  Current Discharge Medication List      START taking these medications    Details   oxyCODONE (ROXICODONE) 5 MG immediate release tablet Take 1 tablet by mouth every 6 hours as needed for Pain for up to 4 days. Qty: 16 tablet, Refills: 0    Comments: Reduce doses taken as pain becomes manageable  Associated Diagnoses: Status post CSXN         CONTINUE these medications which have NOT CHANGED    Details   Prenatal Vit-Fe Fumarate-FA (PRENATAL 1+1 PO) Take 1 tablet by mouth      aspirin 81 MG EC tablet Take 81 mg by mouth daily      triamcinolone (KENALOG) 0.1 % cream Apply topically 2 times daily for 1 week.   Qty: 1 Tube, Refills: 0         STOP taking these medications       Multiple Vitamins-Minerals (THERAPEUTIC MULTIVITAMIN-MINERALS) tablet Comments:   Reason for Stopping:                   CSXN Delivery  Diet regular    Condition: Stable  Discharge to:  home  Follow up in 1 week    Patient to be discharged on 2021    Brian Dixon PA-C

## 2021-06-30 NOTE — FLOWSHEET NOTE
Pt had large 600 ml emesis of food from supper. Instructed pt to just do ice chips for  Now until nausea passes. Zofran has already been given. Will continue to monitor.

## 2021-06-30 NOTE — FLOWSHEET NOTE
Up to BR for second time with assist to void large amount, Pericare instructions given, voices understanding. Instructed patient she may get up on her own as ling as she does not feel dizzy, she voice understanding.

## 2021-06-30 NOTE — LACTATION NOTE
Assisted pt. With pumping. Encouraged pt. To massage breasts before pumping. Encouraged pt. To hand express for a few minutes before and after pumping. Will continue to follow up with pt. PRN.

## 2021-06-30 NOTE — PROGRESS NOTES
Subjective:     Postpartum Day 1:  Delivery    Patient doing well. Pain well controlled. Mild lochia. Breastfeeding/pumping without difficulty. Pt plans on talking with lactation consultant today . Urination without difficulty. Positive flatus, but no bowel movement. Objective:    VITALS:  BP (!) 96/59   Pulse 78   Temp 97.5 °F (36.4 °C) Comment: oral  Resp 16   Ht 5' 8\" (1.727 m)   Wt 244 lb (110.7 kg)   SpO2 96%   Breastfeeding Unknown   BMI 37.10 kg/m²     Vitals:    21 0813   BP: (!) 96/59   Pulse: 78   Resp: 16   Temp: 97.5 °F (36.4 °C)   SpO2: 96%         General:    alert, appears stated age and cooperative       Abdomen: Soft, nontender. Incision:  healing well, no significant drainage, no significant erythema   Extremities:  warm and dry. No edema. CBC   Lab Results   Component Value Date    WBC 8.3 2021    HGB 10.0 (L) 2021    HCT 30.2 (L) 2021    MCV 92.6 2021     2021        Assessment:     Status post  section. Doing well postoperatively. Plan:     Continue current care. Breastfeeding support given. Encouraged ambulation.        DENEEN Downey

## 2021-06-30 NOTE — FLOWSHEET NOTE
Up to BR for first time with assist to void large amount, Pericare instructions given, voices understanding. Instructed patient to call for help up x1 more, she voiced understanding.

## 2021-06-30 NOTE — FLOWSHEET NOTE
Pt dangled at bedside, tolerated well. Pt stood at bedside also. Alexandra care done, small lochia.  Pads changed

## 2021-07-01 VITALS
OXYGEN SATURATION: 96 % | WEIGHT: 244 LBS | TEMPERATURE: 97.9 F | SYSTOLIC BLOOD PRESSURE: 145 MMHG | RESPIRATION RATE: 17 BRPM | HEIGHT: 68 IN | DIASTOLIC BLOOD PRESSURE: 83 MMHG | BODY MASS INDEX: 36.98 KG/M2 | HEART RATE: 94 BPM

## 2021-07-01 PROCEDURE — 6370000000 HC RX 637 (ALT 250 FOR IP): Performed by: OBSTETRICS & GYNECOLOGY

## 2021-07-01 PROCEDURE — 6370000000 HC RX 637 (ALT 250 FOR IP)

## 2021-07-01 RX ORDER — IBUPROFEN 800 MG/1
TABLET ORAL
Status: COMPLETED
Start: 2021-07-01 | End: 2021-07-01

## 2021-07-01 RX ADMIN — ACETAMINOPHEN 1000 MG: 500 TABLET ORAL at 16:41

## 2021-07-01 RX ADMIN — IBUPROFEN 800 MG: 800 TABLET, FILM COATED ORAL at 16:42

## 2021-07-01 RX ADMIN — OXYCODONE 5 MG: 5 TABLET ORAL at 16:42

## 2021-07-01 RX ADMIN — PRENATAL WITH FERROUS FUM AND FOLIC ACID 1 TABLET: 3080; 920; 120; 400; 22; 1.84; 3; 20; 10; 1; 12; 200; 27; 25; 2 TABLET ORAL at 08:51

## 2021-07-01 RX ADMIN — ASPIRIN 81 MG: 81 TABLET, COATED ORAL at 08:52

## 2021-07-01 RX ADMIN — IBUPROFEN 800 MG: 800 TABLET, FILM COATED ORAL at 08:50

## 2021-07-01 RX ADMIN — SIMETHICONE 80 MG: 80 TABLET, CHEWABLE ORAL at 16:42

## 2021-07-01 RX ADMIN — DOCUSATE SODIUM 100 MG: 100 CAPSULE, LIQUID FILLED ORAL at 08:51

## 2021-07-01 RX ADMIN — ACETAMINOPHEN 1000 MG: 500 TABLET ORAL at 01:10

## 2021-07-01 RX ADMIN — ACETAMINOPHEN 1000 MG: 500 TABLET ORAL at 08:51

## 2021-07-01 RX ADMIN — IBUPROFEN 800 MG: 800 TABLET, FILM COATED ORAL at 01:10

## 2021-07-01 RX ADMIN — ONDANSETRON 8 MG: 4 TABLET, ORALLY DISINTEGRATING ORAL at 16:42

## 2021-07-01 ASSESSMENT — PAIN SCALES - GENERAL
PAINLEVEL_OUTOF10: 5
PAINLEVEL_OUTOF10: 5
PAINLEVEL_OUTOF10: 6
PAINLEVEL_OUTOF10: 6
PAINLEVEL_OUTOF10: 5
PAINLEVEL_OUTOF10: 3

## 2021-07-01 NOTE — PLAN OF CARE
Problem: Discharge Planning:  Goal: Discharged to appropriate level of care  Description: Discharged to appropriate level of care  7/1/2021 1409 by Lucendia Severin, RN  Outcome: Completed  Note: Home today  7/1/2021 0114 by Eleuterio Watson RN  Outcome: Ongoing  Note: Plans to be discharged home with family when appropriate       Problem: Fluid Volume - Imbalance:  Goal: Absence of postpartum hemorrhage signs and symptoms  Description: Absence of postpartum hemorrhage signs and symptoms  7/1/2021 1409 by Lucendia Severin, RN  Outcome: Completed  Note: Vaginal bleeding WNL, Fundus firm and midline, no clots or foul odors. 7/1/2021 0114 by Eleuterio Watson RN  Note: Vital signs and assessments WNL, scant rubra lochia, no clots     Problem: Infection - Surgical Site:  Goal: Will show no infection signs and symptoms  Description: Will show no infection signs and symptoms  7/1/2021 1409 by Lucendia Severin, RN  Outcome: Completed  Note: Vital signs and assessments WNL. 7/1/2021 0114 by Eleuterio Watson RN  Outcome: Ongoing  Note: Vital signs and assessments WNL, scant old drainage on dressing     Problem: Mood - Altered:  Goal: Mood stable  Description: Mood stable  7/1/2021 1409 by Lucendia Severin, RN  Outcome: Completed  Note: Bonding with baby, participating in infant care. 7/1/2021 0114 by Eleuterio Watson RN  Outcome: Ongoing  Note: Bonding with baby, participating in infant care. Problem: Pain - Acute:  Goal: Pain level will decrease  Description: Pain level will decrease  7/1/2021 1409 by Lucendia Severin, RN  Outcome: Completed  Note: Pain controlled with po meds. Discussed ice for incisional pain or the use of warm blanket/heating pad for uterine cramps. Pt states her pain goal 4/10 has been met. 7/1/2021 0114 by Eleuterio Watson RN  Outcome: Ongoing  Note: Pain controlled with po meds. Discussed ice for perineal pain and/or incisional pain or the use of warm blanket/heating pad for uterine cramps.  Pt states her pain goal 4/10 has been met. Problem: Venous Thromboembolism:  Goal: Will show no signs or symptoms of venous thromboembolism  Description: Will show no signs or symptoms of venous thromboembolism  7/1/2021 1409 by Jo Joaquin RN  Outcome: Completed  Note: Rodrick Phelan sign negative    7/1/2021 0114 by Debra Richardson RN  Outcome: Ongoing  Note: No muscle tightness, pain, or redness noted  on lower extremities. Ambulating in halls        Problem: Pain:  Goal: Pain level will decrease  Description: Pain level will decrease  7/1/2021 1409 by Jo Joaquin RN  Outcome: Completed  Note: Pain controlled with po meds. Discussed ice for incisional pain or the use of warm blanket/heating pad for uterine cramps. Pt states her pain goal 4/10 has been met. 7/1/2021 0114 by Debra Richardson RN  Outcome: Ongoing  Note: Pain controlled with po meds. Discussed ice for perineal pain and/or incisional pain or the use of warm blanket/heating pad for uterine cramps. Pt states her pain goal 4/10 has been met. Care plan reviewed with patient and she contributes to goal setting and voices understanding of plan of care.

## 2021-07-01 NOTE — FLOWSHEET NOTE
Post birth warning signs education paper given and reviewed, teaching complete. [place] postpartum depression screening discussed with patient, instructed to contact her healthcare provider if her score is > 10. Patient voiced understanding. Mother's blood type is A+. Infant has roomed in with mother this shift  Benefits of rooming in discussed.

## 2021-07-01 NOTE — PLAN OF CARE
goal 4/10 has been met. Care plan reviewed with patient and . Patient and  verbalize understanding of the plan of care and contribute to goal setting.

## 2021-08-09 ENCOUNTER — OFFICE VISIT (OUTPATIENT)
Dept: NEUROLOGY | Age: 41
End: 2021-08-09
Payer: COMMERCIAL

## 2021-08-09 VITALS
OXYGEN SATURATION: 96 % | DIASTOLIC BLOOD PRESSURE: 80 MMHG | HEART RATE: 91 BPM | HEIGHT: 68 IN | BODY MASS INDEX: 32.13 KG/M2 | WEIGHT: 212 LBS | SYSTOLIC BLOOD PRESSURE: 124 MMHG

## 2021-08-09 DIAGNOSIS — G43.109 MIGRAINE WITH AURA AND WITHOUT STATUS MIGRAINOSUS, NOT INTRACTABLE: Primary | ICD-10-CM

## 2021-08-09 PROCEDURE — 99213 OFFICE O/P EST LOW 20 MIN: CPT | Performed by: PSYCHIATRY & NEUROLOGY

## 2021-08-09 RX ORDER — IBUPROFEN 400 MG/1
400 TABLET ORAL EVERY 6 HOURS PRN
COMMUNITY

## 2021-08-09 RX ORDER — SUMATRIPTAN 50 MG/1
50 TABLET, FILM COATED ORAL PRN
COMMUNITY
Start: 2021-08-03 | End: 2022-09-16 | Stop reason: SDUPTHER

## 2021-08-09 NOTE — PROGRESS NOTES
NEUROLOGY OUT PATIENT FOLLOW UP NOTE:  8/9/20211:48 PM    Den Mcneal is here for follow up for headache. Her headache has increased in frequency. It is still the same location and pattern. She is on Imitrex as needed for severe headache. She also complains of fatigue. She feels she can struggle with her concentration. Her sleep is poor and interrupted. She wakes up feeling tired from sleep. She has been told she snores. She comes in today to discuss the plan of care going forward. ROS:  Respiratory : no cough, no shortness of breath  Cardiac: no chest pain. No palpitations. Renal : no flank pain, no hematuria    Skin: no rash      No Known Allergies    Current Outpatient Medications:     SUMAtriptan (IMITREX) 50 MG tablet, Take 50 mg by mouth as needed, Disp: , Rfl:     Docusate Sodium (COLACE PO), Take by mouth daily, Disp: , Rfl:     Polyethylene Glycol 3350 (MIRALAX PO), Take by mouth daily, Disp: , Rfl:     VITAMIN D PO, Take by mouth daily, Disp: , Rfl:     ibuprofen (ADVIL;MOTRIN) 400 MG tablet, Take 400 mg by mouth every 6 hours as needed for Pain, Disp: , Rfl:     Prenatal Vit-Fe Fumarate-FA (PRENATAL 1+1 PO), Take 1 tablet by mouth, Disp: , Rfl:     aspirin 81 MG EC tablet, Take 81 mg by mouth daily, Disp: , Rfl:     triamcinolone (KENALOG) 0.1 % cream, Apply topically 2 times daily for 1 week. (Patient not taking: Reported on 8/9/2021), Disp: 1 Tube, Rfl: 0      PE:   Vitals:    08/09/21 1331   BP: 124/80   Site: Left Upper Arm   Position: Sitting   Cuff Size: Medium Adult   Pulse: 91   SpO2: 96%   Weight: 212 lb (96.2 kg)   Height: 5' 8\" (1.727 m)     General Appearance:  awake, alert, oriented, in no acute distress, she is wearing mask. Gen: NAD, Language is Intact. Skin: no rash, lesion, dry to touch. warm  Head: no rash, no icterus  Neck: There is no carotid bruits. The Neck is supple. There is no neck lymphadenopathy. Neuro: CN 2-12 grossly intact with no focal deficits. Power 5/5 Throughout symmetric, Reflexes are  symmetric. Long tracts are intact. Cerebellar exam is Intact. Sensory exam is intact to light touch. Gait is intact. Musculoskeletal:  Has no hand arthritis, no limitation of ROM in any of the four extremities.   Lower extremities no edema          DATA:  Results for orders placed or performed during the hospital encounter of 06/29/21   CBC   Result Value Ref Range    WBC 6.2 4.8 - 10.8 thou/mm3    RBC 3.56 (L) 4.20 - 5.40 mill/mm3    Hemoglobin 10.8 (L) 12.0 - 16.0 gm/dl    Hematocrit 32.4 (L) 37.0 - 47.0 %    MCV 91.0 81.0 - 99.0 fL    MCH 30.3 26.0 - 33.0 pg    MCHC 33.3 32.2 - 35.5 gm/dl    RDW-CV 13.8 11.5 - 14.5 %    RDW-SD 45.7 (H) 35.0 - 45.0 fL    Platelets 935 933 - 166 thou/mm3    MPV 10.2 9.4 - 12.4 fL   DRUG SCREEN MULTI URINE   Result Value Ref Range    AMPHETAMINE+METHAMPHETAMINE URINE SCREEN Negative NEGATIVE    Barbiturate Quant, Ur Negative NEGATIVE    Benzodiazepine Quant, Ur Negative NEGATIVE    Cannabinoid Quant, Ur Negative NEGATIVE    Cocaine Metab Quant, Ur Negative NEGATIVE    Opiates, Urine Negative NEGATIVE    Oxycodone Negative NEGATIVE    PCP Quant, Ur Negative NEGATIVE   RPR Reflex to Titer and TPPA   Result Value Ref Range    RPR NONREACTIVE NONREACTIVE   CBC auto differential   Result Value Ref Range    WBC 8.3 4.8 - 10.8 thou/mm3    RBC 3.26 (L) 4.20 - 5.40 mill/mm3    Hemoglobin 10.0 (L) 12.0 - 16.0 gm/dl    Hematocrit 30.2 (L) 37.0 - 47.0 %    MCV 92.6 81.0 - 99.0 fL    MCH 30.7 26.0 - 33.0 pg    MCHC 33.1 32.2 - 35.5 gm/dl    RDW-CV 13.8 11.5 - 14.5 %    RDW-SD 47.1 (H) 35.0 - 45.0 fL    Platelets 213 187 - 423 thou/mm3    MPV 10.2 9.4 - 12.4 fL    Seg Neutrophils 75.4 %    Lymphocytes 14.2 %    Monocytes 7.2 %    Eosinophils 1.7 %    Basophils 0.5 %    Immature Granulocytes 1.0 %    Segs Absolute 6.3 1 - 7 thou/mm3    Lymphocytes Absolute 1.2 1.0 - 4.8 thou/mm3    Monocytes Absolute 0.6 0.4 - 1.3 thou/mm3    Eosinophils Absolute 0.1 frequency. She is 6 weeks post partum, she is breastfeeding now and is back on imitrex prescribed by [facility], she denies new symptoms, the headache is in the same pattern, her sleep is interrupted with the new baby. She is taking vitamin B12 sublingual supplements. She feels she can struggle with her concentration. Her sleep is poor and interrupted. She wakes up feeling tired from sleep. She has been told she snores. After detailed discussion with patient we agreed on the following plan. Plan:  1. Continue with Imitrex 50 mg as needed for headache. 2. Take vitamin B12 sublingual supplements daily, over the counter, at least 3000 mcg  3. Follow up in 12 months or sooner if needed  4. Call if any questions or concerns. Please call if any questions.      Dane Goff MD

## 2021-08-09 NOTE — PATIENT INSTRUCTIONS
1. Continue with Imitrex 50 mg as needed for headache. 2. Take vitamin B12 sublingual supplements daily, over the counter, at least 3000 mcg  3. Follow up in 12 months or sooner if needed  4. Call if any questions or concerns.

## 2022-09-16 DIAGNOSIS — G43.109 MIGRAINE WITH AURA AND WITHOUT STATUS MIGRAINOSUS, NOT INTRACTABLE: Primary | ICD-10-CM

## 2022-09-16 RX ORDER — SUMATRIPTAN 50 MG/1
50 TABLET, FILM COATED ORAL PRN
Qty: 9 TABLET | Refills: 3 | Status: SHIPPED | OUTPATIENT
Start: 2022-09-16

## 2023-06-05 ENCOUNTER — TELEPHONE (OUTPATIENT)
Dept: NEUROLOGY | Age: 43
End: 2023-06-05

## 2023-06-05 NOTE — TELEPHONE ENCOUNTER
Patient called stating she is having eye twitching over the past 3 weeks. She is having worsening migraines since the eye twitching started, same migraines as in the past. She is having new light sensitivity with her migraines. She is working on getting scheduled with ophthalmology. Last dilated eye exam was over 1 year ago. She also has pain at the base of her neck with shooting pains down arm and tingling in hand for the past 5 weeks. She is taking Imitrex that helps with migraine. Please advise. Thank you.

## 2023-06-06 NOTE — TELEPHONE ENCOUNTER
Made  2 attempts to contact patient, but bad connection and patient unable to hear nurse. Will try again.

## 2023-06-06 NOTE — TELEPHONE ENCOUNTER
Suggest to get evaluation with ophthalmology and schedule with us to follow for the headache also.    Landon Zacarias MD

## 2023-06-29 ENCOUNTER — OFFICE VISIT (OUTPATIENT)
Dept: NEUROLOGY | Age: 43
End: 2023-06-29
Payer: COMMERCIAL

## 2023-06-29 VITALS
OXYGEN SATURATION: 96 % | SYSTOLIC BLOOD PRESSURE: 118 MMHG | DIASTOLIC BLOOD PRESSURE: 76 MMHG | BODY MASS INDEX: 36.64 KG/M2 | HEART RATE: 95 BPM | WEIGHT: 241 LBS

## 2023-06-29 DIAGNOSIS — G43.109 MIGRAINE WITH AURA AND WITHOUT STATUS MIGRAINOSUS, NOT INTRACTABLE: ICD-10-CM

## 2023-06-29 DIAGNOSIS — G43.109 MIGRAINE WITH AURA AND WITHOUT STATUS MIGRAINOSUS, NOT INTRACTABLE: Primary | ICD-10-CM

## 2023-06-29 DIAGNOSIS — G24.5 EYE TWITCH: ICD-10-CM

## 2023-06-29 PROCEDURE — 99214 OFFICE O/P EST MOD 30 MIN: CPT | Performed by: NURSE PRACTITIONER

## 2023-06-29 RX ORDER — SUMATRIPTAN 50 MG/1
50 TABLET, FILM COATED ORAL PRN
Qty: 9 TABLET | Refills: 3 | Status: SHIPPED | OUTPATIENT
Start: 2023-06-29

## 2023-06-29 RX ORDER — NITROFURANTOIN 25; 75 MG/1; MG/1
CAPSULE ORAL
COMMUNITY
Start: 2023-06-28

## 2023-07-28 ENCOUNTER — HOSPITAL ENCOUNTER (OUTPATIENT)
Dept: NEUROLOGY | Age: 43
Discharge: HOME | End: 2023-07-28
Payer: COMMERCIAL

## 2023-07-28 ENCOUNTER — HOSPITAL ENCOUNTER (OUTPATIENT)
Dept: MRI IMAGING | Age: 43
Discharge: HOME | End: 2023-07-28
Payer: COMMERCIAL

## 2023-07-28 ENCOUNTER — TELEPHONE (OUTPATIENT)
Dept: NEUROLOGY | Age: 43
End: 2023-07-28

## 2023-07-28 DIAGNOSIS — G24.5 EYE TWITCH: ICD-10-CM

## 2023-07-28 DIAGNOSIS — G43.109 MIGRAINE WITH AURA AND WITHOUT STATUS MIGRAINOSUS, NOT INTRACTABLE: ICD-10-CM

## 2023-07-28 PROCEDURE — 6360000004 HC RX CONTRAST MEDICATION: Performed by: NURSE PRACTITIONER

## 2023-07-28 PROCEDURE — A9579 GAD-BASE MR CONTRAST NOS,1ML: HCPCS | Performed by: NURSE PRACTITIONER

## 2023-07-28 PROCEDURE — 95816 EEG AWAKE AND DROWSY: CPT

## 2023-07-28 PROCEDURE — 70553 MRI BRAIN STEM W/O & W/DYE: CPT

## 2023-07-28 RX ADMIN — GADOTERIDOL 20 ML: 279.3 INJECTION, SOLUTION INTRAVENOUS at 09:46

## 2023-07-28 NOTE — TELEPHONE ENCOUNTER
----- Message from ERIKA Sherman CNP sent at 7/28/2023 11:34 AM EDT -----  Please let patient know her MRI brain showed Negative MRI scan of the brain with and without intravenous contrast, no interval change since previous study dated 2/15/2019.   Jordyn Clark CNP

## 2023-10-16 ENCOUNTER — OFFICE VISIT (OUTPATIENT)
Dept: NEUROLOGY | Age: 43
End: 2023-10-16
Payer: COMMERCIAL

## 2023-10-16 VITALS
WEIGHT: 246 LBS | HEIGHT: 68 IN | DIASTOLIC BLOOD PRESSURE: 81 MMHG | OXYGEN SATURATION: 97 % | HEART RATE: 80 BPM | BODY MASS INDEX: 37.28 KG/M2 | SYSTOLIC BLOOD PRESSURE: 111 MMHG

## 2023-10-16 DIAGNOSIS — G43.109 MIGRAINE WITH AURA AND WITHOUT STATUS MIGRAINOSUS, NOT INTRACTABLE: Primary | ICD-10-CM

## 2023-10-16 PROCEDURE — 99213 OFFICE O/P EST LOW 20 MIN: CPT | Performed by: PSYCHIATRY & NEUROLOGY

## 2023-10-16 RX ORDER — SUMATRIPTAN 50 MG/1
50 TABLET, FILM COATED ORAL PRN
Qty: 9 TABLET | Refills: 7 | Status: SHIPPED | OUTPATIENT
Start: 2023-10-16

## 2023-10-16 NOTE — PATIENT INSTRUCTIONS
Continue with Imitrex 50 mg as needed for headache.   Take vitamin B12 sublingual supplements daily, over the counter, at least 3000 mcg  Follow up in 9 month or sooner if needed  Call if any questions or concerns

## 2023-10-16 NOTE — PROGRESS NOTES
the hospital encounter of 02/15/19    MRI BRAIN WO CONTRAST  Narrative  PROCEDURE: MRI BRAIN WO CONTRAST  CLINICAL INFORMATION Nonintractable episodic headache, unspecified headache type. Frontal headaches. Pain behind her eyes for the past 2 years. The headaches have become more frequent. COMPARISON: No prior study. TECHNIQUE: Multiplanar and multiple spin echo MRI images were obtained of the brain without contrast.  FINDINGS:  The diffusion-weighted images are normal.  The brain volume is normal. There is a minimal amount of abnormal signal in the deep white matter the brain. These foci are small and faint. There are no intra-or extra-axial collections. There is no hydrocephalus, midline shift or mass effect. There is mineralization in the medial aspects of the basal ganglia bilaterally. No other areas of susceptibility artifact are present. The major intracranial vascular flow voids are present. The midline craniocervical junction structures are normal.  The pituitary gland and brainstem are normal.  Impression  Minimal amount of abnormal signal in deep white matter of the brain. This could be due to chronic headaches. This can also be related to chronic small vessel ischemic changes, prior inflammation or prior trauma. **This report has been created using voice recognition software. It may contain minor errors which are inherent in voice recognition technology. **  Final report electronically signed by Dr. Myrtis Merlin on 2/15/2019 4:58 PM       EEG:   IMPRESSION:  This is a normal EEG. There was no evidence of  epileptiform activity appreciated. MANOJ Rogers MD        MRI Brain:   IMPRESSION:     1. Negative MRI scan of the brain with and without intravenous contrast, no interval change since previous study dated 2/15/2019.  2. Mild inflammatory changes in ethmoid air cells, mastoid air cells and maxillary sinuses bilaterally. .              **This report has been created using voice recognition

## 2023-11-03 ENCOUNTER — HOSPITAL ENCOUNTER (EMERGENCY)
Age: 43
Discharge: HOME | End: 2023-11-03
Payer: COMMERCIAL

## 2023-11-03 ENCOUNTER — APPOINTMENT (OUTPATIENT)
Dept: GENERAL RADIOLOGY | Age: 43
End: 2023-11-03
Payer: COMMERCIAL

## 2023-11-03 VITALS
HEART RATE: 100 BPM | RESPIRATION RATE: 18 BRPM | WEIGHT: 240 LBS | BODY MASS INDEX: 36.37 KG/M2 | OXYGEN SATURATION: 97 % | TEMPERATURE: 97.6 F | DIASTOLIC BLOOD PRESSURE: 88 MMHG | SYSTOLIC BLOOD PRESSURE: 138 MMHG | HEIGHT: 68 IN

## 2023-11-03 DIAGNOSIS — S20.211A RIB CONTUSION, RIGHT, INITIAL ENCOUNTER: ICD-10-CM
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
## 2023-11-03 DIAGNOSIS — S70.01XA CONTUSION OF RIGHT HIP, INITIAL ENCOUNTER: ICD-10-CM

## 2023-11-03 PROCEDURE — 73502 X-RAY EXAM HIP UNI 2-3 VIEWS: CPT

## 2023-11-03 PROCEDURE — 6370000000 HC RX 637 (ALT 250 FOR IP): Performed by: NURSE PRACTITIONER

## 2023-11-03 PROCEDURE — 71101 X-RAY EXAM UNILAT RIBS/CHEST: CPT

## 2023-11-03 PROCEDURE — 99283 EMERGENCY DEPT VISIT LOW MDM: CPT

## 2023-11-03 RX ORDER — LIDOCAINE 4 G/G
1 PATCH TOPICAL DAILY
Qty: 30 PATCH | Refills: 0 | Status: SHIPPED | OUTPATIENT
Start: 2023-11-03 | End: 2023-12-03

## 2023-11-03 RX ORDER — NAPROXEN 500 MG/1
500 TABLET ORAL 2 TIMES DAILY WITH MEALS
Qty: 30 TABLET | Refills: 0 | Status: SHIPPED | OUTPATIENT
Start: 2023-11-03 | End: 2023-11-18

## 2023-11-03 RX ORDER — LIDOCAINE 4 G/G
1 PATCH TOPICAL DAILY
Status: DISCONTINUED | OUTPATIENT
Start: 2023-11-03 | End: 2023-11-03 | Stop reason: HOSPADM

## 2023-11-03 RX ORDER — NAPROXEN 250 MG/1
500 TABLET ORAL ONCE
Status: COMPLETED | OUTPATIENT
Start: 2023-11-03 | End: 2023-11-03

## 2023-11-03 RX ADMIN — NAPROXEN 500 MG: 250 TABLET ORAL at 12:57

## 2023-11-03 ASSESSMENT — PAIN DESCRIPTION - LOCATION: LOCATION: BACK

## 2023-11-03 ASSESSMENT — PAIN SCALES - GENERAL: PAINLEVEL_OUTOF10: 6

## 2023-11-03 ASSESSMENT — PAIN - FUNCTIONAL ASSESSMENT: PAIN_FUNCTIONAL_ASSESSMENT: 0-10

## 2023-11-03 NOTE — ED PROVIDER NOTES
[facility] Emergency Department    CHIEF COMPLAINT       Chief Complaint   Patient presents with    Back Pain       Nurses Notes reviewed and I agree except as noted in the HPI. HISTORY OF PRESENT ILLNESS   Joey Romero is a 44 y.o. female who presents to the ED for evaluation of back pain after falling while going down stairs. During this fall she also reports hitting her head and her knee, but these areas are not painful. She denies headache, loss of consciousness, nausea, vomiting, and changes in vision. She is primarily having pain in the lower right side of her back over her ribs and her posterior right hip. The pain over her ribs is constant and aching but worsens and becomes sharper with deep breathing so she has been breathing more shallowly since her injury. She states that walking is painful, but she is able to walk in a straight line well. Sudden movements exacerbate her pain. She has shortness of breath, but attributes this to choosing to take shallow breaths to minimize her pain. HPI was provided by the patient. PAST MEDICAL HISTORY     Past Medical History:   Diagnosis Date    Diabetes mellitus ([facility])     gestational diabetes    Fibroid uterus 2018    Infertility, female        Irvin Flores      has a past surgical history that includes Adrenalectomy (2012);  section (2011);  section (2011); knee surgery (Right, ); [place] tooth extraction (); Tympanostomy tube placement (age 4);  section (2014);  section, low transverse (2021); and  section (N/A, 2021).     CURRENT MEDICATIONS       Discharge Medication List as of 11/3/2023  1:14 PM        CONTINUE these medications which have NOT CHANGED    Details   Multiple Vitamin (MULTIVITAMIN ADULT PO) Take by mouth DAILY MULTIVITAMINHistorical Med      SUMAtriptan (IMITREX) 50 MG tablet Take 1 tablet by mouth as needed for Migraine, Disp-9 tablet, R-7Normal

## 2024-02-29 ENCOUNTER — HOSPITAL ENCOUNTER (OUTPATIENT)
Dept: CT IMAGING | Age: 44
Discharge: HOME | End: 2024-02-29
Attending: FAMILY MEDICINE
Payer: COMMERCIAL

## 2024-02-29 DIAGNOSIS — R10.31 RLQ ABDOMINAL PAIN: ICD-10-CM

## 2024-02-29 PROCEDURE — 6360000004 HC RX CONTRAST MEDICATION: Performed by: FAMILY MEDICINE

## 2024-02-29 PROCEDURE — 74178 CT ABD&PLV WO CNTR FLWD CNTR: CPT

## 2024-02-29 RX ADMIN — IOPAMIDOL 100 ML: 755 INJECTION, SOLUTION INTRAVENOUS at 12:36

## 2024-07-19 ENCOUNTER — OFFICE VISIT (OUTPATIENT)
Dept: NEUROLOGY | Age: 44
End: 2024-07-19
Payer: COMMERCIAL

## 2024-07-19 VITALS
OXYGEN SATURATION: 98 % | HEIGHT: 68 IN | SYSTOLIC BLOOD PRESSURE: 115 MMHG | DIASTOLIC BLOOD PRESSURE: 80 MMHG | HEART RATE: 78 BPM | BODY MASS INDEX: 35.46 KG/M2 | WEIGHT: 234 LBS

## 2024-07-19 DIAGNOSIS — G43.109 MIGRAINE WITH AURA AND WITHOUT STATUS MIGRAINOSUS, NOT INTRACTABLE: ICD-10-CM

## 2024-07-19 PROCEDURE — 99213 OFFICE O/P EST LOW 20 MIN: CPT | Performed by: NURSE PRACTITIONER

## 2024-07-19 PROCEDURE — G8427 DOCREV CUR MEDS BY ELIG CLIN: HCPCS | Performed by: NURSE PRACTITIONER

## 2024-07-19 PROCEDURE — G8417 CALC BMI ABV UP PARAM F/U: HCPCS | Performed by: NURSE PRACTITIONER

## 2024-07-19 PROCEDURE — 1036F TOBACCO NON-USER: CPT | Performed by: NURSE PRACTITIONER

## 2024-07-19 RX ORDER — SUMATRIPTAN 50 MG/1
50 TABLET, FILM COATED ORAL PRN
Qty: 9 TABLET | Refills: 11 | Status: SHIPPED | OUTPATIENT
Start: 2024-07-19

## 2024-07-19 RX ORDER — DEXTROAMPHETAMINE SACCHARATE, AMPHETAMINE ASPARTATE, DEXTROAMPHETAMINE SULFATE AND AMPHETAMINE SULFATE 2.5; 2.5; 2.5; 2.5 MG/1; MG/1; MG/1; MG/1
10 TABLET ORAL DAILY PRN
COMMUNITY

## 2024-07-19 NOTE — PATIENT INSTRUCTIONS
Continue with Imitrex 50 mg as needed for headache. Refills given  Take vitamin B12 sublingual supplements daily, over the counter, at least 3000 mcg  Follow up in 12 months or sooner if needed  Call if any questions or concerns

## 2024-07-19 NOTE — PROGRESS NOTES
NEUROLOGY OUT PATIENT FOLLOW UP NOTE:  7/19/20248:43 AM    Jaymie Gutierrez is here for follow up for headache.            No Known Allergies    Current Outpatient Medications:     Ascorbic Acid (VITAMIN C PO), Take by mouth, Disp: , Rfl:     Cyanocobalamin (B-12 PO), Take by mouth, Disp: , Rfl:     MAGNESIUM CITRATE PO, Take by mouth, Disp: , Rfl:     amphetamine-dextroamphetamine (ADDERALL, 10MG,) 10 MG tablet, Take 1 tablet by mouth daily as needed. Max Daily Amount: 10 mg, Disp: , Rfl:     AMOXICILLIN PO, Take by mouth, Disp: , Rfl:     naproxen (NAPROSYN) 500 MG tablet, Take 1 tablet by mouth 2 times daily (with meals) for 30 doses, Disp: 30 tablet, Rfl: 0    Multiple Vitamin (MULTIVITAMIN ADULT PO), Take by mouth DAILY MULTIVITAMIN, Disp: , Rfl:     SUMAtriptan (IMITREX) 50 MG tablet, Take 1 tablet by mouth as needed for Migraine, Disp: 9 tablet, Rfl: 7    Polyethylene Glycol 3350 (MIRALAX PO), Take by mouth daily, Disp: , Rfl:     VITAMIN D PO, Take by mouth daily, Disp: , Rfl:     ibuprofen (ADVIL;MOTRIN) 400 MG tablet, Take 1 tablet by mouth every 6 hours as needed for Pain, Disp: , Rfl:     I reviewed the past medical history, allergies, medications, social history and family history.       PE:   Vitals:    07/19/24 0837   BP: 115/80   Site: Left Upper Arm   Position: Sitting   Cuff Size: Medium Adult   Pulse: 78   SpO2: 98%   Weight: 106.1 kg (234 lb)   Height: 1.727 m (5' 8\")        General Appearance:  awake, alert, oriented   Gen: NAD, Language is Intact. Skin: no rash, lesion, dry to touch. warm  Head: no rash, no icterus  Neck: The Neck is supple.    Neuro: CN 2-12 grossly intact with no focal deficits. Power 5/5 Throughout symmetric, Reflexes are  symmetric. Long tracts are intact. Cerebellar exam is Intact. Sensory exam is intact to light touch.  Gait is intact.  Musculoskeletal:  Has no hand arthritis, no limitation of ROM in any of the four extremities.  Lower extremities no edema        DATA:

## 2024-09-16 ENCOUNTER — HOSPITAL ENCOUNTER (OUTPATIENT)
Dept: GENERAL RADIOLOGY | Age: 44
Discharge: HOME | End: 2024-09-16
Payer: COMMERCIAL

## 2024-09-16 ENCOUNTER — HOSPITAL ENCOUNTER (OUTPATIENT)
Age: 44
Discharge: HOME | End: 2024-09-16
Payer: COMMERCIAL

## 2024-09-16 DIAGNOSIS — M54.2 CERVICAL PAIN: ICD-10-CM

## 2024-09-16 PROCEDURE — 72040 X-RAY EXAM NECK SPINE 2-3 VW: CPT

## 2024-10-08 ENCOUNTER — HOSPITAL ENCOUNTER (OUTPATIENT)
Dept: PHYSICAL THERAPY | Age: 44
Setting detail: THERAPIES SERIES
End: 2024-10-08
Payer: COMMERCIAL

## 2024-10-09 ENCOUNTER — HOSPITAL ENCOUNTER (OUTPATIENT)
Dept: MAMMOGRAPHY | Age: 44
Discharge: HOME | End: 2024-10-09
Payer: COMMERCIAL

## 2024-10-09 VITALS — WEIGHT: 230 LBS | HEIGHT: 68 IN | BODY MASS INDEX: 34.86 KG/M2

## 2024-10-09 DIAGNOSIS — Z12.39 BREAST SCREENING: ICD-10-CM

## 2024-10-09 PROCEDURE — 77067 SCR MAMMO BI INCL CAD: CPT

## 2024-10-17 ENCOUNTER — HOSPITAL ENCOUNTER (OUTPATIENT)
Dept: PHYSICAL THERAPY | Age: 44
Setting detail: THERAPIES SERIES
Discharge: HOME | End: 2024-10-17

## 2024-10-17 NOTE — FLOWSHEET NOTE
* PLEASE SIGN, DATE AND TIME CERTIFICATION BELOW AND RETURN TO [facility] OUTPATIENT REHABILITATION (FAX #: [phone]).  ATTEST/CO-SIGN IF ACCESSING VIA INBASKET.  THANK YOU.**    I certify that I have examined the patient below and determined that Physical Medicine and Rehabilitation service is necessary and that I approve the established plan of care for up to 90 days or as specifically noted.  Attestation, signature or co-signature of physician indicates approval of certification requirements.    ________________________ ____________  Physician Signature   Date   [facility]  PHYSICAL THERAPY  [x] EVALUATION  [] DAILY NOTE (LAND) [] DAILY NOTE (AQUATIC ) [] PROGRESS NOTE [] DISCHARGE NOTE    [] OUTPATIENT REHABILITATION [facility]   [] [facility]    [x] [facility]   [] [facility]    Date: 10/17/2024  Patient Name:  Jaymie Gutierrez  : 1980  MRN: [id number]  CSN: [id number]    Referring Practitioner Zechariah Calvert MD [id number]      Diagnosis Low back pain, unspecified   Treatment Diagnosis M54.2  Neck Pain  R29.3 Abnormal Posture  R53.1 Weakness   Date of Evaluation 10/17/24   Additional Pertinent History Jaymie Gutierrez has a past medical history of Diabetes mellitus (HCC), Fibroid uterus, and Infertility, female.  she has a past surgical history that includes Adrenalectomy (2012);  section (2011);  section (2011); knee surgery (Right, ); [place] tooth extraction (); Tympanostomy tube placement (age 3);  section (2014);  section, low transverse (2021); and  section (N/A, 2021).     Allergies No Known Allergies   Medications   Current Outpatient Medications:     Ascorbic Acid (VITAMIN C PO), Take by mouth, Disp: , Rfl:     Cyanocobalamin (B-12 PO), Take by mouth, Disp: , Rfl:     MAGNESIUM CITRATE PO, Take by mouth, Disp: , Rfl:

## 2024-10-22 ENCOUNTER — HOSPITAL ENCOUNTER (OUTPATIENT)
Dept: PHYSICAL THERAPY | Age: 44
Setting detail: THERAPIES SERIES
End: 2024-10-22
Payer: COMMERCIAL

## 2024-10-24 ENCOUNTER — HOSPITAL ENCOUNTER (OUTPATIENT)
Dept: PHYSICAL THERAPY | Age: 44
Setting detail: THERAPIES SERIES
Discharge: HOME | End: 2024-10-24
Payer: COMMERCIAL

## 2024-10-24 PROCEDURE — 97140 MANUAL THERAPY 1/> REGIONS: CPT

## 2024-10-24 PROCEDURE — 97110 THERAPEUTIC EXERCISES: CPT

## 2024-10-24 PROCEDURE — 97035 APP MDLTY 1+ULTRASOUND EA 15: CPT

## 2024-10-24 NOTE — PROGRESS NOTES
[facility]  PHYSICAL THERAPY  [] EVALUATION  [x] DAILY NOTE (LAND) [] DAILY NOTE (AQUATIC ) [] PROGRESS NOTE [] DISCHARGE NOTE    [] OUTPATIENT REHABILITATION [facility]   [] [facility]    [x] [facility]   [] [facility]    Date: 10/24/2024  Patient Name:  Jaymie Gutierrez  : 1980  MRN: [id number]  CSN: [id number]    Referring Practitioner Zechariah Calvert MD [id number]      Diagnosis Low back pain, unspecified   Treatment Diagnosis M54.2  Neck Pain  R29.3 Abnormal Posture  R53.1 Weakness   Date of Evaluation 10/17/24   Additional Pertinent History Jaymie Gutierrez has a past medical history of Diabetes mellitus (HCC), Fibroid uterus, and Infertility, female.  she has a past surgical history that includes Adrenalectomy (2012);  section (2011);  section (2011); knee surgery (Right, ); [place] tooth extraction (); Tympanostomy tube placement (age 3);  section (2014);  section, low transverse (2021); and  section (N/A, 2021).     Allergies No Known Allergies   Medications   Current Outpatient Medications:     Ascorbic Acid (VITAMIN C PO), Take by mouth, Disp: , Rfl:     Cyanocobalamin (B-12 PO), Take by mouth, Disp: , Rfl:     MAGNESIUM CITRATE PO, Take by mouth, Disp: , Rfl:     amphetamine-dextroamphetamine (ADDERALL, 10MG,) 10 MG tablet, Take 1 tablet by mouth daily as needed. Max Daily Amount: 10 mg, Disp: , Rfl:     AMOXICILLIN PO, Take by mouth, Disp: , Rfl:     SUMAtriptan (IMITREX) 50 MG tablet, Take 1 tablet by mouth as needed for Migraine, Disp: 9 tablet, Rfl: 11    naproxen (NAPROSYN) 500 MG tablet, Take 1 tablet by mouth 2 times daily (with meals) for 30 doses, Disp: 30 tablet, Rfl: 0    Multiple Vitamin (MULTIVITAMIN ADULT PO), Take by mouth DAILY MULTIVITAMIN, Disp: , Rfl:     Polyethylene Glycol 3350 (MIRALAX PO), Take by mouth daily, Disp: , Rfl:

## 2024-10-29 ENCOUNTER — HOSPITAL ENCOUNTER (OUTPATIENT)
Dept: PHYSICAL THERAPY | Age: 44
Setting detail: THERAPIES SERIES
Discharge: HOME | End: 2024-10-29
Payer: COMMERCIAL

## 2024-10-29 PROCEDURE — 97140 MANUAL THERAPY 1/> REGIONS: CPT

## 2024-10-29 PROCEDURE — 97035 APP MDLTY 1+ULTRASOUND EA 15: CPT

## 2024-10-29 PROCEDURE — 97110 THERAPEUTIC EXERCISES: CPT

## 2024-10-29 NOTE — PROGRESS NOTES
[facility]  PHYSICAL THERAPY  [] EVALUATION  [x] DAILY NOTE (LAND) [] DAILY NOTE (AQUATIC ) [] PROGRESS NOTE [] DISCHARGE NOTE    [] OUTPATIENT REHABILITATION [facility]   [] [facility]    [x] [facility]   [] [facility]    Date: 10/29/2024    Patient Name:  Jaymie Gutierrez  : 1980  MRN: [id number]  CSN: [id number]    Referring Practitioner Zechariah Calvert MD [id number]      Diagnosis Low back pain, unspecified   Treatment Diagnosis M54.2  Neck Pain  R29.3 Abnormal Posture  R53.1 Weakness   Date of Evaluation 10/17/24   Additional Pertinent History Jaymie Gutierrez has a past medical history of Diabetes mellitus (HCC), Fibroid uterus, and Infertility, female.  she has a past surgical history that includes Adrenalectomy (2012);  section (2011);  section (2011); knee surgery (Right, ); [place] tooth extraction (); Tympanostomy tube placement (age 3);  section (2014);  section, low transverse (2021); and  section (N/A, 2021).     Allergies No Known Allergies   Medications   Current Outpatient Medications:     Ascorbic Acid (VITAMIN C PO), Take by mouth, Disp: , Rfl:     Cyanocobalamin (B-12 PO), Take by mouth, Disp: , Rfl:     MAGNESIUM CITRATE PO, Take by mouth, Disp: , Rfl:     amphetamine-dextroamphetamine (ADDERALL, 10MG,) 10 MG tablet, Take 1 tablet by mouth daily as needed. Max Daily Amount: 10 mg, Disp: , Rfl:     AMOXICILLIN PO, Take by mouth, Disp: , Rfl:     SUMAtriptan (IMITREX) 50 MG tablet, Take 1 tablet by mouth as needed for Migraine, Disp: 9 tablet, Rfl: 11    naproxen (NAPROSYN) 500 MG tablet, Take 1 tablet by mouth 2 times daily (with meals) for 30 doses, Disp: 30 tablet, Rfl: 0    Multiple Vitamin (MULTIVITAMIN ADULT PO), Take by mouth DAILY MULTIVITAMIN, Disp: , Rfl:     Polyethylene Glycol 3350 (MIRALAX PO), Take by mouth daily, Disp: , Rfl:

## 2024-10-31 ENCOUNTER — HOSPITAL ENCOUNTER (OUTPATIENT)
Dept: PHYSICAL THERAPY | Age: 44
Setting detail: THERAPIES SERIES
Discharge: HOME | End: 2024-10-31
Payer: COMMERCIAL

## 2024-10-31 PROCEDURE — 97110 THERAPEUTIC EXERCISES: CPT

## 2024-10-31 PROCEDURE — 97035 APP MDLTY 1+ULTRASOUND EA 15: CPT

## 2024-10-31 NOTE — PROGRESS NOTES
VITAMIN D PO, Take by mouth daily, Disp: , Rfl:     ibuprofen (ADVIL;MOTRIN) 400 MG tablet, Take 1 tablet by mouth every 6 hours as needed for Pain, Disp: , Rfl:       Functional Outcome Measure Used NDI   Functional Outcome Score 15 (10/17/24)       Insurance: Primary: Payor: [employer] /  /  / ,   Secondary:    Authorization Information PRE CERTIFICATION REQUIRED: No precert required.  INSURANCE THERAPY BENEFIT: Visits approved based on medical necessity.. .   AQUATIC THERAPY COVERED: Yes  MODALITIES COVERED:  Yes   Initial CPT Codes Requested 55318 - Manual Therapy, 30702 - Therapeutic Exercise  , 57558 - Ultrasound , and 10848 - Manual Electrical Stimulation   Progress Note Counter 4/10 for progress note (Reporting Period: Evaluation 10-17-24)   Recertification Date 12/12/24   Physician Follow-Up None   Physician Orders New updated script received from Dr. Calvert with addition of diagnosis Neck Pain (M54.2)   History of Present Illness      SUBJECTIVE: Patient reports had increased soreness after last session and it turned into a migraine. States finally calmed down and she is doing ok today but she is still not sleeping at night. States the pain is more centered around her lower cervical spine.        TREATMENT   Precautions: None   Pain: 2/10 neck    \"X” in shaded column indicates activity completed today    “*\" next to exercise/intervention indicates progression   Modalities Parameters/  Location  Notes   US to bilat sides of cervical spine around C5-T1. 4 min to ea side, 1.0 MHz, 0.8 W/cm2, 50% pulsed X                Manual Therapy Time/Technique  Notes   Manual traction and occipital release - able to tolerate treatment for longer with each but did eventually cause some increased pulling/soreness/pain so stopped 4-5 min each     Myofascial and TP work to bilat sides cervical spine in sitting 3 min X    Palpation and light Grade 1 mobs around C6-T1 - very painful and had to stop

## 2024-12-05 ENCOUNTER — HOSPITAL ENCOUNTER (OUTPATIENT)
Dept: MRI IMAGING | Age: 44
Discharge: HOME | End: 2024-12-05
Payer: COMMERCIAL

## 2024-12-05 DIAGNOSIS — M50.322 DEGENERATION OF C5-C6 INTERVERTEBRAL DISC: ICD-10-CM

## 2024-12-05 DIAGNOSIS — S16.1XXA NECK STRAIN, INITIAL ENCOUNTER: ICD-10-CM

## 2024-12-05 PROCEDURE — 72141 MRI NECK SPINE W/O DYE: CPT

## 2025-08-18 ENCOUNTER — OFFICE VISIT (OUTPATIENT)
Dept: NEUROLOGY | Age: 45
End: 2025-08-18
Payer: COMMERCIAL

## 2025-08-18 VITALS
DIASTOLIC BLOOD PRESSURE: 76 MMHG | WEIGHT: 248.38 LBS | HEART RATE: 102 BPM | HEIGHT: 68 IN | SYSTOLIC BLOOD PRESSURE: 122 MMHG | OXYGEN SATURATION: 96 % | BODY MASS INDEX: 37.64 KG/M2

## 2025-08-18 DIAGNOSIS — G43.109 MIGRAINE WITH AURA AND WITHOUT STATUS MIGRAINOSUS, NOT INTRACTABLE: Primary | ICD-10-CM

## 2025-08-18 PROCEDURE — 1036F TOBACCO NON-USER: CPT | Performed by: PSYCHIATRY & NEUROLOGY

## 2025-08-18 PROCEDURE — G8427 DOCREV CUR MEDS BY ELIG CLIN: HCPCS | Performed by: PSYCHIATRY & NEUROLOGY

## 2025-08-18 PROCEDURE — G8417 CALC BMI ABV UP PARAM F/U: HCPCS | Performed by: PSYCHIATRY & NEUROLOGY

## 2025-08-18 PROCEDURE — 99213 OFFICE O/P EST LOW 20 MIN: CPT | Performed by: PSYCHIATRY & NEUROLOGY

## 2025-08-18 RX ORDER — MEDROXYPROGESTERONE ACETATE 5 MG
TABLET ORAL
COMMUNITY
Start: 2025-08-15

## 2025-08-18 RX ORDER — ESCITALOPRAM OXALATE 10 MG/1
TABLET ORAL
COMMUNITY
Start: 2025-07-30

## 2025-08-18 RX ORDER — SUMATRIPTAN 50 MG/1
50 TABLET, FILM COATED ORAL PRN
Qty: 9 TABLET | Refills: 11 | Status: SHIPPED | OUTPATIENT
Start: 2025-08-18

## 2025-08-18 RX ORDER — ESTRADIOL 0.5 MG/1
TABLET ORAL
COMMUNITY
Start: 2025-08-15

## 2025-08-18 RX ORDER — GABAPENTIN 100 MG/1
100 CAPSULE ORAL DAILY PRN
COMMUNITY

## (undated) DEVICE — SUTURE VCRL + SZ 2-0 L27IN ABSRB CLR CT-1 1/2 CIR TAPERCUT VCP259H

## (undated) DEVICE — SUTURE SZ 2-0 L27IN ABSRB BRN CTX L36MM 1/2 CIR SGL ARMED 872H

## (undated) DEVICE — PACK PROCEDURE SURG C SECT SRMC LF

## (undated) DEVICE — APPLICATOR PREP 26ML 0.7% IOD POVACRYLEX 74% ISO ALC ST

## (undated) DEVICE — SUTURE VCRL + SZ 0 L27IN ABSRB VLT L36MM CT-1 1/2 CIR VCPB260H

## (undated) DEVICE — SUTURE VCRL SZ 4-0 L27IN ABSRB UD L60MM KS STR REV CUT NDL J662H